# Patient Record
Sex: FEMALE | Race: WHITE | NOT HISPANIC OR LATINO | Employment: FULL TIME | URBAN - METROPOLITAN AREA
[De-identification: names, ages, dates, MRNs, and addresses within clinical notes are randomized per-mention and may not be internally consistent; named-entity substitution may affect disease eponyms.]

---

## 2018-02-14 ENCOUNTER — HOME CARE/HOSPICE - HEALTHEAST (OUTPATIENT)
Dept: HOME HEALTH SERVICES | Facility: HOME HEALTH | Age: 33
End: 2018-02-14

## 2018-02-17 ENCOUNTER — HOME CARE/HOSPICE - HEALTHEAST (OUTPATIENT)
Dept: HOME HEALTH SERVICES | Facility: HOME HEALTH | Age: 33
End: 2018-02-17

## 2018-02-20 ENCOUNTER — COMMUNICATION - HEALTHEAST (OUTPATIENT)
Dept: OBGYN | Facility: HOSPITAL | Age: 33
End: 2018-02-20

## 2018-02-21 ENCOUNTER — COMMUNICATION - HEALTHEAST (OUTPATIENT)
Dept: OBGYN | Facility: HOSPITAL | Age: 33
End: 2018-02-21

## 2018-02-22 ENCOUNTER — COMMUNICATION - HEALTHEAST (OUTPATIENT)
Dept: OBGYN | Facility: HOSPITAL | Age: 33
End: 2018-02-22

## 2018-02-23 ENCOUNTER — AMBULATORY - HEALTHEAST (OUTPATIENT)
Dept: OBGYN | Facility: HOSPITAL | Age: 33
End: 2018-02-23

## 2018-03-01 ENCOUNTER — COMMUNICATION - HEALTHEAST (OUTPATIENT)
Dept: OBGYN | Facility: HOSPITAL | Age: 33
End: 2018-03-01

## 2018-03-06 ENCOUNTER — AMBULATORY - HEALTHEAST (OUTPATIENT)
Dept: OBGYN | Facility: HOSPITAL | Age: 33
End: 2018-03-06

## 2021-05-30 ENCOUNTER — RECORDS - HEALTHEAST (OUTPATIENT)
Dept: ADMINISTRATIVE | Facility: CLINIC | Age: 36
End: 2021-05-30

## 2021-06-16 PROBLEM — Z34.90 PREGNANT: Status: ACTIVE | Noted: 2018-02-12

## 2021-06-16 NOTE — PROGRESS NOTES
Infant:  Alex Mason  2-    CC 1:  Weight - Weight today 7# 9.3 oz = + 4 oz weight gain 2 days - Great improvement:  How: Mother Dasha opted to bottle feed baby over last 2 days, using APNO, nipples starting to heal, baby spit up not improving despite tips; keep baby upright, burp techniques, paced bottle upright and/or side lying, Plan:  Continue with techniques , go to premie flow, fu next week lactation, weight will be reported to MD via [employer] fax    CC 2:  Risk for yeast/thrush: Infant was seen to have white film on tongue 2 days ago, no other s/s, is developing a diaper rash in addition to white film on tongue, couple at high risk for yeast due to nipple trama, mother is being treated with APNO, Education sheet provided at previous appt. Plan:  recommendation to have MD gema s/s of yeast.     CC 3:  Spit up/not contented after feedings,  baby observed today to have moderate-excessive spit up during our 20 min appt, observed a bottle feeding, baby takes 4 sucks, swallows then needs a breathing break, then starts again, unsettled and fussy after feedings, using pacifier to allow her time to settle belly but not huge impact in decreasing spit up.  Baby tends to roll in upper lip during latch on bottle, due to latch issues, mothers cracked nipples suggested to have MD ribeiro mouth for potential restrictions Plan:  discussed for her to have MD ribeiro baby for spit up/latch issues

## 2021-06-16 NOTE — PROGRESS NOTES
Weight check:  8# 8.7 oz - over 1 oz per weight gain per day average on ebf'ing   Diaper rash still present, spit up also still there, but congestion has slightly decreased, will fu with Md about diaper rash, breastfeeding has now improved and will continue with ebfing and pumping for return to work/ call back if any concerns

## 2022-03-31 ENCOUNTER — LAB REQUISITION (OUTPATIENT)
Dept: LAB | Facility: CLINIC | Age: 37
End: 2022-03-31

## 2022-03-31 DIAGNOSIS — R51.9 HEADACHE, UNSPECIFIED: ICD-10-CM

## 2022-03-31 LAB
BASOPHILS # BLD AUTO: 0.1 10E3/UL (ref 0–0.2)
BASOPHILS NFR BLD AUTO: 1 %
C REACTIVE PROTEIN LHE: <0.1 MG/DL (ref 0–0.8)
EOSINOPHIL # BLD AUTO: 0.1 10E3/UL (ref 0–0.7)
EOSINOPHIL NFR BLD AUTO: 2 %
ERYTHROCYTE [DISTWIDTH] IN BLOOD BY AUTOMATED COUNT: 13 % (ref 10–15)
ERYTHROCYTE [SEDIMENTATION RATE] IN BLOOD BY WESTERGREN METHOD: 4 MM/HR (ref 0–20)
HCT VFR BLD AUTO: 40.8 % (ref 35–47)
HGB BLD-MCNC: 13.2 G/DL (ref 11.7–15.7)
IMM GRANULOCYTES # BLD: 0 10E3/UL
IMM GRANULOCYTES NFR BLD: 0 %
LYMPHOCYTES # BLD AUTO: 2.9 10E3/UL (ref 0.8–5.3)
LYMPHOCYTES NFR BLD AUTO: 43 %
MCH RBC QN AUTO: 29.3 PG (ref 26.5–33)
MCHC RBC AUTO-ENTMCNC: 32.4 G/DL (ref 31.5–36.5)
MCV RBC AUTO: 91 FL (ref 78–100)
MONOCYTES # BLD AUTO: 0.7 10E3/UL (ref 0–1.3)
MONOCYTES NFR BLD AUTO: 11 %
NEUTROPHILS # BLD AUTO: 2.9 10E3/UL (ref 1.6–8.3)
NEUTROPHILS NFR BLD AUTO: 43 %
NRBC # BLD AUTO: 0 10E3/UL
NRBC BLD AUTO-RTO: 0 /100
PLATELET # BLD AUTO: 219 10E3/UL (ref 150–450)
RBC # BLD AUTO: 4.5 10E6/UL (ref 3.8–5.2)
WBC # BLD AUTO: 6.7 10E3/UL (ref 4–11)

## 2022-03-31 PROCEDURE — 85025 COMPLETE CBC W/AUTO DIFF WBC: CPT | Performed by: PHYSICIAN ASSISTANT

## 2022-03-31 PROCEDURE — 86140 C-REACTIVE PROTEIN: CPT | Performed by: PHYSICIAN ASSISTANT

## 2022-03-31 PROCEDURE — 85652 RBC SED RATE AUTOMATED: CPT | Performed by: PHYSICIAN ASSISTANT

## 2022-04-13 ENCOUNTER — OFFICE VISIT (OUTPATIENT)
Dept: INTERNAL MEDICINE | Facility: CLINIC | Age: 37
End: 2022-04-13
Payer: COMMERCIAL

## 2022-04-13 ENCOUNTER — ANCILLARY PROCEDURE (OUTPATIENT)
Dept: GENERAL RADIOLOGY | Facility: CLINIC | Age: 37
End: 2022-04-13
Attending: NURSE PRACTITIONER
Payer: COMMERCIAL

## 2022-04-13 VITALS
HEIGHT: 64 IN | SYSTOLIC BLOOD PRESSURE: 120 MMHG | OXYGEN SATURATION: 98 % | WEIGHT: 140.9 LBS | BODY MASS INDEX: 24.05 KG/M2 | TEMPERATURE: 98.3 F | DIASTOLIC BLOOD PRESSURE: 78 MMHG | HEART RATE: 98 BPM

## 2022-04-13 DIAGNOSIS — R07.9 CHEST PAIN, UNSPECIFIED TYPE: Primary | ICD-10-CM

## 2022-04-13 DIAGNOSIS — M54.2 NECK PAIN: ICD-10-CM

## 2022-04-13 DIAGNOSIS — R07.9 CHEST PAIN, UNSPECIFIED TYPE: ICD-10-CM

## 2022-04-13 DIAGNOSIS — G44.219 EPISODIC TENSION-TYPE HEADACHE, NOT INTRACTABLE: ICD-10-CM

## 2022-04-13 LAB
ATRIAL RATE - MUSE: 73 BPM
DIASTOLIC BLOOD PRESSURE - MUSE: NORMAL MMHG
INTERPRETATION ECG - MUSE: NORMAL
P AXIS - MUSE: 10 DEGREES
PR INTERVAL - MUSE: 138 MS
QRS DURATION - MUSE: 84 MS
QT - MUSE: 392 MS
QTC - MUSE: 431 MS
R AXIS - MUSE: 27 DEGREES
SYSTOLIC BLOOD PRESSURE - MUSE: NORMAL MMHG
T AXIS - MUSE: 26 DEGREES
VENTRICULAR RATE- MUSE: 73 BPM

## 2022-04-13 PROCEDURE — 71046 X-RAY EXAM CHEST 2 VIEWS: CPT | Mod: TC | Performed by: RADIOLOGY

## 2022-04-13 PROCEDURE — 93005 ELECTROCARDIOGRAM TRACING: CPT | Performed by: NURSE PRACTITIONER

## 2022-04-13 PROCEDURE — 93010 ELECTROCARDIOGRAM REPORT: CPT | Performed by: INTERNAL MEDICINE

## 2022-04-13 PROCEDURE — 99204 OFFICE O/P NEW MOD 45 MIN: CPT | Performed by: NURSE PRACTITIONER

## 2022-04-13 RX ORDER — LEVONORGESTREL AND ETHINYL ESTRADIOL 0.15-0.03
1 KIT ORAL DAILY
COMMUNITY
Start: 2022-02-12

## 2022-04-13 RX ORDER — CYCLOBENZAPRINE HCL 5 MG
5 TABLET ORAL 3 TIMES DAILY PRN
Qty: 30 TABLET | Refills: 0 | Status: SHIPPED | OUTPATIENT
Start: 2022-04-13 | End: 2022-10-05

## 2022-04-13 RX ORDER — SUMATRIPTAN 50 MG/1
50 TABLET, FILM COATED ORAL
Qty: 30 TABLET | Refills: 0 | Status: SHIPPED | OUTPATIENT
Start: 2022-04-13 | End: 2022-06-13 | Stop reason: SINTOL

## 2022-04-13 ASSESSMENT — ANXIETY QUESTIONNAIRES
6. BECOMING EASILY ANNOYED OR IRRITABLE: NOT AT ALL
5. BEING SO RESTLESS THAT IT IS HARD TO SIT STILL: NOT AT ALL
2. NOT BEING ABLE TO STOP OR CONTROL WORRYING: NOT AT ALL
7. FEELING AFRAID AS IF SOMETHING AWFUL MIGHT HAPPEN: NOT AT ALL
1. FEELING NERVOUS, ANXIOUS, OR ON EDGE: NOT AT ALL
GAD7 TOTAL SCORE: 0
3. WORRYING TOO MUCH ABOUT DIFFERENT THINGS: NOT AT ALL

## 2022-04-13 ASSESSMENT — PATIENT HEALTH QUESTIONNAIRE - PHQ9: 5. POOR APPETITE OR OVEREATING: NOT AT ALL

## 2022-04-13 NOTE — PATIENT INSTRUCTIONS
I want you to continue the naproxen for now.    Lets add cyclobenzaprine muscle relaxer to your regimen to help with neck stiffness.  This might help your headaches as well.    Omeprazole 20 mg 30 minutes before breakfast every day, in case your chest discomfort is due to GI acid reflux    Imitrex migraine medication sent into pharmacy.  Take 1 tablet right away, followed by an additional tablet 2 hours later if symptoms persist.    Work on hydration.  Minimize caffeine and alcohol.  Eat small snacks throughout the day.    Chest x-ray today.    EKG normal.    Establish care appointment with new PCP later this spring.    Referral to physical therapy to work on chest pain and neck pain..    Follow-up in 1 month

## 2022-04-13 NOTE — PROGRESS NOTES
Assessment & Plan     Chest pain, unspecified type  The EKG was personally interpreted by me today as normal sinus rhythm with no ST changes.  Her symptoms are occasionally positional in nature but also seem to be related to food intake.  Chest x-ray today.  Empiric treatment with omeprazole.  I do think she would be a good candidate for PT given her neck pain.  Perhaps PT could also assess her chest discomfort    - EKG 12-lead, tracing only  - omeprazole (PRILOSEC) 20 MG DR capsule; Take 1 capsule (20 mg) by mouth daily  - XR Chest 2 Views; Future    Neck pain  Cyclobenzaprine and PT.  I think her neck pain could be the juanito for her headaches.  Also going to have her continue the naproxen and add Imitrex    - Physical Therapy Referral; Future  - cyclobenzaprine (FLEXERIL) 5 MG tablet; Take 1 tablet (5 mg) by mouth 3 times daily as needed for muscle spasms    Episodic tension-type headache, not intractable  Normal neurological exam today.  She had a brain MRI done at an outside family clinic last week.  There were some chronic stable changes with possible gliosis but unclear if this would be the juanito for her headaches.  If her headaches continue despite today's interventions, we may need to consider referral to neurology    - SUMAtriptan (IMITREX) 50 MG tablet; Take 1 tablet (50 mg) by mouth at onset of headache for migraine May repeat in 2 hours. Max 4 tablets/24 hours.    Patient Instructions   I want you to continue the naproxen for now.    Lets add cyclobenzaprine muscle relaxer to your regimen to help with neck stiffness.  This might help your headaches as well.    Omeprazole 20 mg 30 minutes before breakfast every day, in case your chest discomfort is due to GI acid reflux    Imitrex migraine medication sent into pharmacy.  Take 1 tablet right away, followed by an additional tablet 2 hours later if symptoms persist.    Work on hydration.  Minimize caffeine and alcohol.  Eat small snacks throughout the  day.    Chest x-ray today.    EKG normal.    Establish care appointment with new PCP later this spring.    Referral to physical therapy to work on chest pain and neck pain..    Follow-up in 1 month      Review of external notes as documented elsewhere in note  Prescription drug management  22 minutes spent on the date of the encounter doing chart review, history and exam, documentation and further activities per the note     See Patient Instructions    Return in about 1 month (around 5/13/2022) for Follow up.    Darruis Vanegas, [facility]    Belle Lou is a 37 year old who presents for the following health issues   HPI     Here for constellation of different symptoms.  She has been having some intermittent headaches and actually saw a provider at a community clinic last week.  Brain MRI was ordered.  She had that done a few days ago.  Results are difficult to abstract from her records but it appears as though there is some chronic changes that were noted.  Similar findings when MRI of her brain in 2017.    She describes some intermittent neck pain and stiffness that have started around the same time as her headaches.    Headaches start in the back of her skull and move towards the front of her head.    No strokelike symptoms however.    She does drink caffeine.  Does not smoke.    PHQ-9 and VINI-7 are unremarkable today.    She has had some intermittent chest pain that she describes as an aching pressure focused mostly around the left breast.  Sometimes positional in nature.  It also seems to be affected by her eating patterns.  When she eats something hot or cold it may affect the discomfort in her chest.    No dyspnea on exertion.  No new cough.  No swelling in her lower extremities.  No pain with deep breathing.  No hemoptysis      Review of Systems   Constitutional, HEENT, cardiovascular, pulmonary, gi and gu systems are negative, except as otherwise noted.     "  Objective    /78 (BP Location: Right arm, Patient Position: Sitting, Cuff Size: Adult Regular)   Pulse 98   Temp 98.3  F (36.8  C) (Oral)   Ht 1.626 m (5' 4\")   Wt 63.9 kg (140 lb 14.4 oz)   SpO2 98%   BMI 24.19 kg/m    Body mass index is 24.19 kg/m .  Physical Exam     Normal neurological exam.  Heart rate is regular without murmur rub or gallop.  Neck strength testing is unremarkable.  Normal ROM    Answers for HPI/ROS submitted by the patient on 4/13/2022  Headache Symptoms are: worsened  How often are you getting headaches or migraines? : Daily  Are you able to do normal daily activities when you have a migraine?: Yes  Migraine Rescue/Relief Medications:: Naproxyn (Aleve)  Effectiveness of rescue/relief medications:: I get no relief  Migraine Preventative Medications:: no medications to prevent migraines  ER or UC Visits:: 0 times  Your back pain is: recurring  Where is your back pain located? : left upper back  How would you describe your back pain? : dull ache  Where does your back pain spread? : left shoulder, left side of neck  Since you noticed your back pain, how has it changed? : always present, but gets better and worse  Does your back pain interfere with your job?: No  How many servings of fruits and vegetables do you eat daily?: 2-3  On average, how many sweetened beverages do you drink each day (Examples: soda, juice, sweet tea, etc.  Do NOT count diet or artificially sweetened beverages)?: 1  How many minutes a day do you exercise enough to make your heart beat faster?: 10 to 19  How many days a week do you exercise enough to make your heart beat faster?: 4  How many days per week do you miss taking your medication?: 0      "

## 2022-04-14 ASSESSMENT — ANXIETY QUESTIONNAIRES: GAD7 TOTAL SCORE: 0

## 2022-04-18 ENCOUNTER — HOSPITAL ENCOUNTER (OUTPATIENT)
Dept: PHYSICAL THERAPY | Facility: REHABILITATION | Age: 37
Discharge: HOME | End: 2022-04-18
Payer: COMMERCIAL

## 2022-04-18 DIAGNOSIS — M54.2 NECK PAIN: ICD-10-CM

## 2022-04-18 PROCEDURE — 97161 PT EVAL LOW COMPLEX 20 MIN: CPT | Mod: GP

## 2022-04-18 PROCEDURE — 97110 THERAPEUTIC EXERCISES: CPT | Mod: GP

## 2022-04-18 NOTE — PROGRESS NOTES
[facility]    OUTPATIENT PHYSICAL THERAPY ORTHOPEDIC EVALUATION  PLAN OF TREATMENT FOR OUTPATIENT REHABILITATION  (COMPLETE FOR INITIAL CLAIMS ONLY)  Patient's Last Name, First Name, M.I.  YOB: 1985  Dasha Leonard    Provider s Name:  [facility]   Medical Record No.  [id number]   Start of Care Date:  04/18/22   Onset Date:  03/28/22   Type:     _X__PT   ___OT   ___SLP Medical Diagnosis:  (P) Cervical pain     PT Diagnosis:  Cervical neck pain   Visits from SOC:  1      _________________________________________________________________________________  Plan of Treatment/Functional Goals:  manual therapy, neuromuscular re-education, ROM, strengthening, stretching     TENS, Traction     Goals  Goal Identifier: driving  Goal Description: pt will be able to drive with hand on steering wheel without symptoms in order to arrive at work safely  Target Date: 07/16/22    Goal Identifier: headaches  Goal Description: pt will report decreased headache frequency to 1-2X/week  Target Date: 07/16/22                                                                      Therapy Frequency:  2 times/Week  Predicted Duration of Therapy Intervention:  6-8, prn    Jenny Mcbride PT                 I CERTIFY THE NEED FOR THESE SERVICES FURNISHED UNDER        THIS PLAN OF TREATMENT AND WHILE UNDER MY CARE     (Physician co-signature of this document indicates review and certification of the therapy plan).                     Certification Date From:  (P) 04/18/22   Certification Date To:  (P) 06/17/22    Referring Provider:  Darrius Vanegas CNP    Initial Assessment        See Epic Evaluation Start of Care Date: 04/18/22 04/18/22 0700   General Information   Type of Visit Initial OP Ortho PT Evaluation    Start of Care Date 04/18/22   Referring Physician Darrius Vanegas CNP   Orders Evaluate and Treat   Date of Order 04/13/22   Certification Required? Yes   Medical Diagnosis Cervical pain   Surgical/Medical history reviewed Yes   Precautions/Limitations no known precautions/limitations   General Information Comments Direct supervision provided; direct patient contact provided; therapy services provided with the co-signing licensed therapist guiding and directing the services as well as providing skilled judgement and assessment throughout the session.   Body Part(s)   Body Part(s) Cervical Spine   Presentation and Etiology   Pertinent history of current problem (include personal factors and/or comorbidities that impact the POC) Pt had chest pain in January 2022. She reports that her chest pain radiates to her back. About 3 weeks ago she had sharp pain in her head, with L>R arm pain that occurred simultaneously. She also experienced neck pain and brief vision changes during these episodes. Her primary care provider suggested PT and wrote a referral. She received her MRI results which came back as negative. She is currently taking acid reflux meds and they appear to be helping with her chest pain. She noticed chocolate can trigger chest pain. Pt was also prescribed a mm relaxer and she reports that it seems to be helping. Her pain comes and goes, and she finds that scapular retraction helps relieve pain.   Impairments A. Pain;N. Headaches   Functional Limitations perform activities of daily living   Symptom Location Cervical spine, head   How/Where did it occur From insidious onset   Onset date of current episode/exacerbation 03/28/22   Chronicity New   Pain rating (0-10 point scale) Best (/10);Worst (/10)   Best (/10) 1   Worst (/10) 5   Pain quality B. Dull   Frequency of pain/symptoms A. Constant   Pain/symptoms are: Other   Pain symptoms comment no pattern   Pain/symptoms exacerbated by M. Other   Pain  "exacerbation comment chocolate exacerbates chest pain, L shoulder flexion (driving)   Pain/symptoms eased by K. Other   Pain eased by comment stretching   Progression of symptoms since onset: Other   Pain progression comment headaches and chest pain have gotten better   Prior Level of Function   Prior Level of Function-Mobility decreases when symptoms increase   Fall Risk Screen   Fall screen completed by PT   Have you fallen 2 or more times in the past year? No   Have you fallen and had an injury in the past year? No   Is patient a fall risk? No   Abuse Screen (yes response referral indicated)   Feels Unsafe at Home or Work/School no   Feels Threatened by Someone no   Does Anyone Try to Keep You From Having Contact with Others or Doing Things Outside Your Home? no   Physical Signs of Abuse Present no   Patient needs abuse support services and resources No   Cervical Spine   Cervical Left Side Bending ROM WNL   Cervical Right Rotation ROM WNL   Cervical Left Rotation ROM WNL   Cervical Flexion ROM WNL, pain in left ribs/midchest   Cervical Extension ROM WNL   Cervical Right Side Bending ROM WNL   Shoulder AROM Screen WNL   Cervical/Thoracic/Shoulder ROM Comments shoulder flexion and abduction on L increases pain in midchest/ribs   Shoulder/Wrist/Hand Strength Comments strength Normal throughout, pain with L shoulder ER at ribs/midchest   Vertebral Artery Test negative   Alar Ligament Test negative   Cervical Distraction Test negative as it does not reporoduce pain or relieve any symptoms   Palpation denies pain to palpation, states pain is deeper.  At SO she had \"vision spots\",   Posture Moderate lordosis, B toe out   Planned Therapy Interventions   Planned Therapy Interventions manual therapy;neuromuscular re-education;ROM;strengthening;stretching   Planned Modality Interventions   Planned Modality Interventions TENS;Traction   Clinical Impression   Criteria for Skilled Therapeutic Interventions Met yes, treatment " indicated   PT Diagnosis Cervical neck pain   Influenced by the following impairments comorbidities   Functional limitations due to impairments driving, holding objects, teaching   Clinical Presentation Stable/Uncomplicated   Clinical Decision Making (Complexity) Low complexity   Therapy Frequency 2 times/Week   Predicted Duration of Therapy Intervention (days/wks) 6-8, prn   Risk & Benefits of therapy have been explained Yes   Patient, Family & other staff in agreement with plan of care Yes   Clinical Impression Comments Pt presents to PT with orders for eval and treat of cervical headaches. Pt presents with symptoms of pain, generalized deconditioning, and moderate lordosis. Skilled PT needed for pain reduction, strength training and education.   ORTHO GOALS   PT Ortho Eval Goals 1;2   Ortho Goal 1   Goal Identifier driving   Goal Description pt will be able to drive with hand on steering wheel without symptoms in order to arrive at work safely   Target Date 07/16/22   Ortho Goal 2   Goal Identifier headaches   Goal Description pt will report decreased headache frequency to 1-2X/week   Target Date 07/16/22   Total Evaluation Time   PT Eval, Low Complexity Minutes (44653) 30   Therapy Certification   Certification date from 04/18/22   Certification date to 06/17/22   Medical Diagnosis Cervical pain

## 2022-04-18 NOTE — ADDENDUM NOTE
Encounter addended by: Jenny Mcbride, PT on: 4/18/2022 1:38 PM   Actions taken: Clinical Note Signed, Flowsheet accepted, Document created, Document edited

## 2022-04-18 NOTE — PROGRESS NOTES
Pt had chest pain in January 2022. She reports that her chest pain radiates to her back. About 3 weeks ago she had sharp pain in her head, with L>R arm pain that occurred simultaneously. She experienced neck pain, vision changes, primary suggested PT, MRI results negative, acid reflux meds are helping with chest pain,-chocolate can set the pain off,  mm relaxer seems to be helping. Pain comes and goes, shoulder retraction helps relieve pain      Plan next visit-fill out NDI

## 2022-05-12 ENCOUNTER — OFFICE VISIT (OUTPATIENT)
Dept: INTERNAL MEDICINE | Facility: CLINIC | Age: 37
End: 2022-05-12
Payer: COMMERCIAL

## 2022-05-12 VITALS
OXYGEN SATURATION: 100 % | BODY MASS INDEX: 25.03 KG/M2 | DIASTOLIC BLOOD PRESSURE: 58 MMHG | HEART RATE: 79 BPM | WEIGHT: 145.8 LBS | SYSTOLIC BLOOD PRESSURE: 104 MMHG

## 2022-05-12 DIAGNOSIS — G44.209 TENSION HEADACHE: Primary | ICD-10-CM

## 2022-05-12 DIAGNOSIS — R10.13 ABDOMINAL DISCOMFORT, EPIGASTRIC: ICD-10-CM

## 2022-05-12 DIAGNOSIS — M54.2 NECK PAIN: ICD-10-CM

## 2022-05-12 PROCEDURE — 99213 OFFICE O/P EST LOW 20 MIN: CPT | Performed by: NURSE PRACTITIONER

## 2022-05-12 NOTE — PROGRESS NOTES
Assessment & Plan     Tension headache  Unclear if these are migraine headaches.  She tried Imitrex but this made her ill.  I think it would be beneficial to get a neurology consult.  She has had a normal head MRI  - Adult Neurology  Referral; Future    Abdominal discomfort, epigastric  She saw great efficacy from the omeprazole.  I told her that she could feasibly restart the omeprazole at a later date if needed.  She should not need it for more than 2 weeks at a time    Neck pain  Advised that she continue with the physical therapy.  This could feasibly improve her headache symptoms    Patient Instructions   Neurology: [phone]      Darrius Vanegas, CNP  [facility]    Belle Lou is a 37 year old who presents for the following health issues   HPI     Here for follow-up.  I originally saw her for a constellation of different symptoms approximately 1 month ago.    Her biggest issue at that time was intermittent headaches that she had been experiencing.  We tried Imitrex but that made her feel sick.  She had an MRI done at a community clinic that came back normal.    She had 1 session of PT for her neck pain and says that this seems to be helping.    Has been on omeprazole for a few weeks for the chest discomfort and says that this seemed to help      Review of Systems   Constitutional, HEENT, cardiovascular, pulmonary, gi and gu systems are negative, except as otherwise noted.      Objective    /58 (BP Location: Right arm, Patient Position: Sitting, Cuff Size: Adult Regular)   Pulse 79   Wt 66.1 kg (145 lb 12.8 oz)   SpO2 100%   BMI 25.03 kg/m    Body mass index is 25.03 kg/m .  Physical Exam   Healthy-appearing adult female

## 2022-05-18 ENCOUNTER — HOSPITAL ENCOUNTER (OUTPATIENT)
Dept: PHYSICAL THERAPY | Facility: REHABILITATION | Age: 37
Discharge: HOME | End: 2022-05-18
Payer: COMMERCIAL

## 2022-05-18 DIAGNOSIS — M54.2 NECK PAIN: Primary | ICD-10-CM

## 2022-05-18 PROCEDURE — 97110 THERAPEUTIC EXERCISES: CPT | Mod: GP

## 2022-05-18 PROCEDURE — 97140 MANUAL THERAPY 1/> REGIONS: CPT | Mod: GP

## 2022-05-18 NOTE — PROGRESS NOTES
Assessment: Pt returns for her 2nd visit and reports a decrease in her pain symptoms, and the arm weakness is still present. Pivitol Therapy system introduced to    pt and she reported feeling significant relief after trialing. HEP initiated and instructed pt in new ex. She completed these with proper form and cueing for equal hips while completing leg ext. MT performed on pts cervical region for relief of symptoms. Pt reported feeling relief after treatment.    Date 5/18/2022   Exercise    Stretches: chin tuck, upper trap, levator scapulae, scalenes X 30 s   Cervical isometrics: SB, rotate, ext X 6-8 reps   All 4s leg ext X 8 reps B   Cat cow X 10 reps with 2 second holds                                       I attest that I was present in the room, making skilled assessments and directing the service provided today.  I was responsible for the assessment and treatment of the patient.  During this time, I was not engaged in treating another patient or doing other tasks.    Jenny Mcbride, PT  Magdalena Alegria, SPT

## 2022-05-23 ENCOUNTER — HOSPITAL ENCOUNTER (OUTPATIENT)
Dept: PHYSICAL THERAPY | Facility: REHABILITATION | Age: 37
Discharge: HOME | End: 2022-05-23
Payer: COMMERCIAL

## 2022-05-23 DIAGNOSIS — M54.2 NECK PAIN: Primary | ICD-10-CM

## 2022-05-23 PROCEDURE — 97140 MANUAL THERAPY 1/> REGIONS: CPT | Mod: GP | Performed by: PHYSICAL THERAPIST

## 2022-05-23 PROCEDURE — 97110 THERAPEUTIC EXERCISES: CPT | Mod: GP | Performed by: PHYSICAL THERAPIST

## 2022-05-23 NOTE — PROGRESS NOTES
Assessment: Pt returns and feels she is about the same.  She did not have any questions on her HEP and did not want to review them. She does feel the stretching helps. She did not have any pain with the addition of the strengthening ex today and did feel the manual therapy helped at her SO region.   She did not have as much pressure and tightness leaving.     Date 5/23/22 5/18/2022   Exercise     Stretches: chin tuck, upper trap, levator scapulae, scalenes  X 30 s   Cervical isometrics: SB, rotation, ext  X 6-8 reps   All 4s leg ext  X 8 reps B   Cat cow  X 10 reps with 2 second holds   UBE 4'    Scapular rows Green tband  X 20 Orange tband X 20   Shoulder ext Green tband: 20    PNF D2 shoulder diagonal Green tband X 20                            Jenny Mcbride, PT  5/23/22

## 2022-06-10 ENCOUNTER — HOSPITAL ENCOUNTER (OUTPATIENT)
Dept: PHYSICAL THERAPY | Facility: REHABILITATION | Age: 37
Discharge: HOME | End: 2022-06-10
Payer: COMMERCIAL

## 2022-06-10 DIAGNOSIS — M54.2 NECK PAIN: Primary | ICD-10-CM

## 2022-06-10 PROCEDURE — 97140 MANUAL THERAPY 1/> REGIONS: CPT | Mod: GP | Performed by: PHYSICAL THERAPIST

## 2022-06-10 PROCEDURE — 97110 THERAPEUTIC EXERCISES: CPT | Mod: GP | Performed by: PHYSICAL THERAPIST

## 2022-06-10 NOTE — PROGRESS NOTES
"  Assessment: Pt returns and feels the left UE is still weaker.  Overall symptoms are decreasing though.  School has been out for a week so doesn't have to hold a book up to read anymore, which has helped decrease some symptoms.  Due to the weakness in her UE she wanted a few more strengthening ex.  She did not feel any pain with them while doing them in the clinic.  Only one area remains sore during manual therapy on L C3-4 region, which decreased in tightness with manual therapy.       Date 6/10/22 5/23/22 5/18/2022   Exercise      Stretches: chin tuck, upper trap, levator scapulae, scalenes   X 30 s   Cervical isometrics: SB, rotation, ext   X 6-8 reps   All 4s leg ext   X 8 reps B   Cat cow   X 10 reps with 2 second holds   UBE  4'    Scapular rows  Green tband  X 20 Orange tband X 20   Shoulder ext  Green tband: 20    PNF D2 shoulder diagonal  Green tband X 20    Shoulder ER and IR, towel under elbow Green tband X 20     Prone:  Shoulder \"I, \"y\", \"T\" and \"W\" X 10-20                     Jenny Mcbride, PT  6/10/22    "

## 2022-06-13 ENCOUNTER — HOSPITAL ENCOUNTER (OUTPATIENT)
Dept: PHYSICAL THERAPY | Facility: REHABILITATION | Age: 37
Discharge: HOME | End: 2022-06-13
Payer: COMMERCIAL

## 2022-06-13 ENCOUNTER — OFFICE VISIT (OUTPATIENT)
Dept: INTERNAL MEDICINE | Facility: CLINIC | Age: 37
End: 2022-06-13

## 2022-06-13 VITALS
BODY MASS INDEX: 24.41 KG/M2 | OXYGEN SATURATION: 100 % | HEART RATE: 78 BPM | SYSTOLIC BLOOD PRESSURE: 104 MMHG | DIASTOLIC BLOOD PRESSURE: 60 MMHG | HEIGHT: 64 IN | WEIGHT: 143 LBS

## 2022-06-13 DIAGNOSIS — Z76.89 ENCOUNTER TO ESTABLISH CARE: ICD-10-CM

## 2022-06-13 DIAGNOSIS — R29.898 BILATERAL ARM WEAKNESS: ICD-10-CM

## 2022-06-13 DIAGNOSIS — M54.2 NECK PAIN: Primary | ICD-10-CM

## 2022-06-13 DIAGNOSIS — M54.2 CERVICALGIA: ICD-10-CM

## 2022-06-13 PROBLEM — R87.810 CERVICAL HIGH RISK HUMAN PAPILLOMAVIRUS (HPV) DNA TEST POSITIVE: Status: ACTIVE | Noted: 2020-09-23

## 2022-06-13 PROBLEM — R63.1 EXCESSIVE THIRST: Status: ACTIVE | Noted: 2022-06-13

## 2022-06-13 PROCEDURE — 97110 THERAPEUTIC EXERCISES: CPT | Mod: GP | Performed by: PHYSICAL THERAPIST

## 2022-06-13 PROCEDURE — 97140 MANUAL THERAPY 1/> REGIONS: CPT | Mod: GP | Performed by: PHYSICAL THERAPIST

## 2022-06-13 PROCEDURE — 99213 OFFICE O/P EST LOW 20 MIN: CPT | Performed by: NURSE PRACTITIONER

## 2022-06-13 NOTE — PATIENT INSTRUCTIONS
To schedule your neck MR call [phone].    Have referred you to see the spine care team, call [phone] to get your first appointment set up.    Finish out physical therapy today.  Continue your home exercises.    Try icy hot or Biofreeze topically as needed for pain control.  Heat 15 minutes 4 times per day, gentle stretching.    We will see you back in 3 months for physical with fasting labs, before then if anything comes up.

## 2022-06-13 NOTE — PROGRESS NOTES
"  Assessment: Pt returns and feels the left UE is still weaker. She saw a new PCP to establish care so got a referral to the [facility].  She did not have time to do the ex since her last visit so reviewed some of them today.  She demonstrated good knowledge with all.  She continued to feel looser after manual therapy again today but after other visits, the tightness returns. Will keep her chart open for 30 days.  Pt will schedule if symptoms change or spine MD wants something specific from PT.  .       Date 6/13/22 6/10/22 5/23/22 5/18/2022   Exercise       Stretches: chin tuck, upper trap, levator scapulae, scalenes    X 30 s   Cervical isometrics: SB, rotation, ext    X 6-8 reps   All 4s leg ext    X 8 reps B   Cat cow    X 10 reps with 2 second holds   UBE 4'  4'    Scapular rows   Green tband  X 20 Orange tband X 20   Shoulder ext   Green tband: 20    PNF D2 shoulder diagonal   Green tband X 20    Shoulder ER and IR, towel under elbow Green tband X 20 Green tband X 20     Prone:  Shoulder \"I, \"y\", \"T\" and \"W\" X 10 each X 10-20                       Jenny Mcbride, PT  6/13/22    "

## 2022-06-13 NOTE — PROGRESS NOTES
Assessment & Plan     Encounter to establish care: Care established today.     Cervicalgia/Bilateral arm weakness: She has tried and failed NSAIDS, physical therapy. Has left greater than right arm weakness, and pain. Suspected nerve or disc issues in her neck. Will obtain an MRI and refer to spine care.   - MR Cervical Spine w/o Contrast  - Spine Referral  - Finish physical therapy.  - Heat 15 minutes four times per day.  - Consider massage.  - Icy hot or biofreeze topically as needed.  - Follow up if symptoms persist or worsen.    Return in about 3 months (around 9/13/2022) for Follow up.    Melany Pereyra CNP  [facility] ADRIENNE Lou is a 37 year old who presents for the following health issues      HPI     The patient presents today to establish care.    She is not fasted today.    She has seen Himanshu Vanegas CNP twice for some neck pain, tension headaches, and bilateral arm weakness, and pain.    She has gone through physical therapy, and her arm weakness, left greater than right continues.    She reports having some pretty significant headaches back a couple of months ago, had a normal brain MRI.    She has not seen the spine care team, will send her there next.    She reports a previous bronchoscopy, had a piece of candy corn removed from a lung.    Her Mom is alive and well. Father passed of lung cancer in 2017, he was 74, was a smoker.    Her grandfather had prostate cancer, and grandmother had breast cancer, with brain metastasis.    She report occasional alcohol. No hx of tobacco or drug use.    She is , has a 4 year old, 2-9 year olds. They are all doing well.    She is a , of autistic and spectrum disorder kids.  Review of Systems   Constitutional, HEENT, cardiovascular, pulmonary, GI, , musculoskeletal, neuro, skin, endocrine and psych systems are negative, except as otherwise noted.      Objective    /60 (BP Location:  "Right arm, Patient Position: Sitting)   Pulse 78   Ht 1.626 m (5' 4\")   Wt 64.9 kg (143 lb)   SpO2 100%   BMI 24.55 kg/m    Body mass index is 24.55 kg/m .  Physical Exam   GENERAL: healthy, alert and no distress  EYES: Eyes grossly normal to inspection, PERRL and conjunctivae and sclerae normal  HENT: ear canals and TM's normal, nose and mouth without ulcers or lesions  NECK: no adenopathy, no asymmetry, masses, or scars and thyroid normal to palpation  RESP: lungs clear to auscultation - no rales, rhonchi or wheezes  CV: regular rate and rhythm, normal S1 S2, no S3 or S4, no murmur, click or rub, no peripheral edema and peripheral pulses strong  MS: no gross musculoskeletal defects noted, no edema  SKIN: no suspicious lesions or rashes  NEURO: Normal strength and tone, mentation intact and speech normal  PSYCH: mentation appears normal, affect normal/bright    "

## 2022-07-11 ENCOUNTER — HOSPITAL ENCOUNTER (OUTPATIENT)
Dept: MRI IMAGING | Facility: CLINIC | Age: 37
Discharge: HOME | End: 2022-07-11
Attending: NURSE PRACTITIONER | Admitting: NURSE PRACTITIONER
Payer: COMMERCIAL

## 2022-07-11 DIAGNOSIS — M54.2 CERVICALGIA: ICD-10-CM

## 2022-07-11 DIAGNOSIS — R29.898 BILATERAL ARM WEAKNESS: ICD-10-CM

## 2022-07-11 PROCEDURE — 72141 MRI NECK SPINE W/O DYE: CPT

## 2022-07-13 ENCOUNTER — TELEPHONE (OUTPATIENT)
Dept: INTERNAL MEDICINE | Facility: CLINIC | Age: 37
End: 2022-07-13

## 2022-07-13 NOTE — TELEPHONE ENCOUNTER
----- Message from Melany Pereyra CNP sent at 7/13/2022  6:49 AM CDT -----  Please call the patient and update her of her results:    IMPRESSION:  1.  Mild degenerative changes in the cervical spine with interspace narrowing upper and mid cervical levels has only minimally progressed from 09/13/2006.     2.  No abnormal intramedullary signal.     3.  There is no evidence for spinal canal compromise or foraminal narrowing at any cervical level      Which means the arthritis in her neck has minimally progressed. No spinal canal narrowing or nerve impingement. See spine care as discussed.

## 2022-07-15 NOTE — PROGRESS NOTES
ASSESSMENT: Dasha Leonard is a 37 year old female with past medical history significant for GERD who presents today for new patient evaluation of neck pain, headaches, left arm weakness, bilateral hand paresthesias.  Symptoms all began about 6 months ago after having COVID.  An MRI cervical spine showed mild multilevel degenerative change but no spinal canal or neuroforaminal stenosis at any level.  Suspect neck pain and headaches are related to myofascial pain/tension headaches.  She did have hypertonicity of the suboccipital muscles on the left.  Unclear etiology of subjective left upper extremity weakness with shoulder abduction.  She did not have any weakness on exam today and her left shoulder exam was benign.  Intermittent hand paresthesias may be related to carpal tunnel syndrome versus ulnar neuropathy.  - Patient also endorses intermittent left flank pain.  Denies constipation.  Denies problems with urination.  Suspect this might be muscular?  She does not have any thoracic or lumbar spine pain.      PLAN:  A shared decision making model was used.  The patient's values and choices were respected.  The following represents what was discussed and decided upon by the physician assistant and the patient.      1.  DIAGNOSTIC TESTS: I reviewed the MRI cervical spine.  - I ordered an EMG bilateral upper extremities for further evaluation of weakness.    2.  PHYSICAL THERAPY: Patient attended 5 sessions of physical therapy ending June 13, 2022.  No further physical therapy was ordered.      3.  MEDICATIONS:    - I offered for the patient to trial gabapentin.  She declined for now.  - Patient takes Flexeril about once per week.  - Naproxen was not helpful.    4.  INTERVENTIONS: No interventions were ordered.    -We potentially consider a trigger point injection into the suboccipital muscle on the left.  - If that cannot help, we could try left C2, C3 medial branch blocks.  - Patient did not have tenderness over  the occipital nerves today.    5.  PATIENT EDUCATION: Patient is in agreement the above plan.  All questions were answered.    6.  FOLLOW-UP:   A nurse will call the patient with results of her EMG.  If she has questions or concerns in the meantime, she should not hesitate to call.        SUBJECTIVE:  Dasha Leonard  Is a 37 year old female who presents today in consultation at the request of Melany Pereyra CNP for new patient evaluation of neck pain/headaches, left arm weakness, bilateral hand paresthesias.  Patient reports that in January 2022 she ate a frosty at [employer].  She had abrupt onset of chest pain.  1 week later she developed COVID.  Shortly after having COVID she experienced a constellation of symptoms including severe headache, neck pain, intermittent hand paresthesias, left flank pain.  Patient reports that she saw her primary care provider at [facility] initially and they prescribed naproxen which was not helpful.  She then sought care with Darrius Amin CNP through [facility].  He prescribed omeprazole which has been very helpful for the chest pain.  He also prescribed Flexeril which has provided some relief of her neck pain and headaches, but symptoms persisted.  She was referred to physical therapy and has had 5 sessions.  She then switched to another doctor who ordered an MRI cervical spine and she is referred to our clinic for further evaluation and treatment.    Patient complains of left-sided neck pain.  Pain is located at the craniocervical junction.  Pain radiates up into the occipital region causing a headache.  She also experiences a left frontal headache.  She states that she feels like there is a knot in the tight muscle in her neck.  This pain comes and goes.  She is unable to identify any aggravating factors to the pain.  Manual traction at physical therapy was helpful.    Patient denies any pain down the arm but she states that the left arm was weak.  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  She would have to adduct her left shoulder and lift the book using only her elbow and wrist muscles to compensate.  At times lifting her arm to drive also because of the weak sensation.  She is right-handed.  Denies any right-sided weakness.    Patient also complains of intermittent hand paresthesias.  Patient reports that after having COVID is soon if she would lay down she would get numbness and tingling in both hands which she states affected the whole hand bilaterally.  This is improving but still occurs occasionally.    Patient also complains of left flank pain.  Denies constipation.  Denies changes with urination.  Denies back pain.    Patient attended 5 sessions of physical therapy ending June 13, 2022.  She still does some of her home exercises.  She does not go to a chiropractor.  She is never had any spine surgeries or spine injections.  She is taking Flexeril about once per week which is somewhat helpful.    Current Outpatient Medications   Medication     cyclobenzaprine (FLEXERIL) 5 MG tablet     KURVELO 0.15-30 MG-MCG tablet         Allergies   Allergen Reactions     Sulfa (Sulfonamide Antibiotics) [Sulfa Drugs] Rash         Patient Active Problem List   Diagnosis     Pregnant     Cervical high risk human papillomavirus (HPV) DNA test positive     Excessive thirst       Surgical history: None    Family history: Father, grandmother, grandfather, and cousin all had cancer.    Social history: Patient is .  She is a special .  She has 3 daughters aged 9, 9, 4.  She drinks alcohol once per month.  Denies tobacco use.  Denies illicit drug use.    ROS: Positive for headache, difficulty swallowing, chest pain, nausea, reflux, muscle fatigue, fainting.  Specifically negative for imbalance, fine motor skill difficulties, bowel/bladder dysfunction, fevers,chills, appetite changes, unexplained  weight loss.   Otherwise 13 systems reviewed are negative.  Please see the patient's intake questionnaire from today for details.      OBJECTIVE:  PHYSICAL EXAMINATION:  CONSTITUTIONAL:  Vital signs as above.  No acute distress.  The patient is well nourished and well groomed.  PSYCHIATRIC:  The patient is awake, alert, oriented to person, place, time and answering questions appropriately with clear speech.    HEENT:  Normocephalic, atraumatic.  Sclera clear.    SKIN:  Skin over the face, bilateral upper extremities, and neck is clean, dry, intact without rashes.  LYMPH NODES:  No palpable or tender anterior/posterior cervical, submandibular, or supraclavicular lymph nodes.    MUSCLE STRENGTH:  5/5 strength for the bilateral shoulder abductors, elbow flexors/extensors, wrist extensors, finger flexors/abductors.  NEURO:  CN III-XII are grossly intact.  1-2+ symmetric biceps, brachioradialis, triceps reflexes bilaterally.  Sensation to light touch intact bilateral upper extremities throughout.  Negative Galarza's bilaterally.  Negative Tinel sign bilateral carpal tunnel and bilateral cubital tunnel.  Negative Phalen sign bilaterally.  VASCULAR:  2/4 radial pulses bilaterally.  Warm upper limbs bilaterally.  Capillary refill in the upper extremities is less than 1 second.  MUSCULOSKELETAL: Patient has mild hypermobility cervical spine.  Hypertonicity left cervical paraspinous muscles in the suboccipital region.  No tenderness to palpation occipital nerves.  No tenderness palpation about the left shoulder.  Range of motion of the left shoulder is full.  Negative empty can sign.  Negative scarf sign.    RESULTS: I reviewed the MRI cervical spine from [facility] dated July 11, 2022.  There is mild multilevel disc degeneration.  There is no spinal canal or neuroforaminal stenosis at any level.  There is no abnormal cord signal.

## 2022-07-18 ENCOUNTER — OFFICE VISIT (OUTPATIENT)
Dept: PHYSICAL MEDICINE AND REHAB | Facility: CLINIC | Age: 37
End: 2022-07-18
Payer: COMMERCIAL

## 2022-07-18 VITALS
HEIGHT: 64 IN | DIASTOLIC BLOOD PRESSURE: 63 MMHG | WEIGHT: 141.1 LBS | SYSTOLIC BLOOD PRESSURE: 117 MMHG | HEART RATE: 90 BPM | BODY MASS INDEX: 24.09 KG/M2

## 2022-07-18 DIAGNOSIS — R29.898 BILATERAL ARM WEAKNESS: ICD-10-CM

## 2022-07-18 DIAGNOSIS — M54.2 CERVICALGIA: ICD-10-CM

## 2022-07-18 DIAGNOSIS — R20.2 PARESTHESIA: Primary | ICD-10-CM

## 2022-07-18 PROCEDURE — 99204 OFFICE O/P NEW MOD 45 MIN: CPT | Performed by: PHYSICIAN ASSISTANT

## 2022-07-18 ASSESSMENT — PAIN SCALES - GENERAL: PAINLEVEL: NO PAIN (1)

## 2022-07-18 NOTE — LETTER
7/18/2022         RE: Dasha Leonard  [address]  [place] [address]        Dear Colleague,    Thank you for referring your patient, Dasha Leonard, to the [facility]. Please see a copy of my visit note below.    ASSESSMENT: Dasha Leonard is a 37 year old female with past medical history significant for GERD who presents today for new patient evaluation of neck pain, headaches, left arm weakness, bilateral hand paresthesias.  Symptoms all began about 6 months ago after having COVID.  An MRI cervical spine showed mild multilevel degenerative change but no spinal canal or neuroforaminal stenosis at any level.  Suspect neck pain and headaches are related to myofascial pain/tension headaches.  She did have hypertonicity of the suboccipital muscles on the left.  Unclear etiology of subjective left upper extremity weakness with shoulder abduction.  She did not have any weakness on exam today and her left shoulder exam was benign.  Intermittent hand paresthesias may be related to carpal tunnel syndrome versus ulnar neuropathy.  - Patient also endorses intermittent left flank pain.  Denies constipation.  Denies problems with urination.  Suspect this might be muscular?  She does not have any thoracic or lumbar spine pain.      PLAN:  A shared decision making model was used.  The patient's values and choices were respected.  The following represents what was discussed and decided upon by the physician assistant and the patient.      1.  DIAGNOSTIC TESTS: I reviewed the MRI cervical spine.  - I ordered an EMG bilateral upper extremities for further evaluation of weakness.    2.  PHYSICAL THERAPY: Patient attended 5 sessions of physical therapy ending June 13, 2022.  No further physical therapy was ordered.      3.  MEDICATIONS:    - I offered for the patient to trial gabapentin.  She declined for now.  - Patient takes Flexeril about once per week.  - Naproxen was not  helpful.    4.  INTERVENTIONS: No interventions were ordered.    -We potentially consider a trigger point injection into the suboccipital muscle on the left.  - If that cannot help, we could try left C2, C3 medial branch blocks.  - Patient did not have tenderness over the occipital nerves today.    5.  PATIENT EDUCATION: Patient is in agreement the above plan.  All questions were answered.    6.  FOLLOW-UP:   A nurse will call the patient with results of her EMG.  If she has questions or concerns in the meantime, she should not hesitate to call.        SUBJECTIVE:  Dasha Leonard  Is a 37 year old female who presents today in consultation at the request of Melany Pereyra CNP for new patient evaluation of neck pain/headaches, left arm weakness, bilateral hand paresthesias.  Patient reports that in January 2022 she ate a frosty at [employer].  She had abrupt onset of chest pain.  1 week later she developed COVID.  Shortly after having COVID she experienced a constellation of symptoms including severe headache, neck pain, intermittent hand paresthesias, left flank pain.  Patient reports that she saw her primary care provider at [facility] initially and they prescribed naproxen which was not helpful.  She then sought care with Darrius Amin CNP through [facility].  He prescribed omeprazole which has been very helpful for the chest pain.  He also prescribed Flexeril which has provided some relief of her neck pain and headaches, but symptoms persisted.  She was referred to physical therapy and has had 5 sessions.  She then switched to another doctor who ordered an MRI cervical spine and she is referred to our clinic for further evaluation and treatment.    Patient complains of left-sided neck pain.  Pain is located at the craniocervical junction.  Pain radiates up into the occipital region causing a headache.  She also experiences a left frontal headache.  She states that she feels like there is a knot in the  tight muscle in her neck.  This pain comes and goes.  She is unable to identify any aggravating factors to the pain.  Manual traction at physical therapy was helpful.    Patient denies any pain down the arm but she states that the left arm was weak.  Patient is a  and she noticed to that after having COVID she could not hold a book up with her left arm.  She would have to adduct her left shoulder and lift the book using only her elbow and wrist muscles to compensate.  At times lifting her arm to drive also because of the weak sensation.  She is right-handed.  Denies any right-sided weakness.    Patient also complains of intermittent hand paresthesias.  Patient reports that after having COVID is soon if she would lay down she would get numbness and tingling in both hands which she states affected the whole hand bilaterally.  This is improving but still occurs occasionally.    Patient also complains of left flank pain.  Denies constipation.  Denies changes with urination.  Denies back pain.    Patient attended 5 sessions of physical therapy ending June 13, 2022.  She still does some of her home exercises.  She does not go to a chiropractor.  She is never had any spine surgeries or spine injections.  She is taking Flexeril about once per week which is somewhat helpful.    Current Outpatient Medications   Medication     cyclobenzaprine (FLEXERIL) 5 MG tablet     KURVELO 0.15-30 MG-MCG tablet         Allergies   Allergen Reactions     Sulfa (Sulfonamide Antibiotics) [Sulfa Drugs] Rash         Patient Active Problem List   Diagnosis     Pregnant     Cervical high risk human papillomavirus (HPV) DNA test positive     Excessive thirst       Surgical history: None    Family history: Father, grandmother, grandfather, and cousin all had cancer.    Social history: Patient is .  She is a special .  She has 3 daughters aged 9, 9, 4.  She drinks alcohol once per month.  Denies tobacco use.  Denies  illicit drug use.    ROS: Positive for headache, difficulty swallowing, chest pain, nausea, reflux, muscle fatigue, fainting.  Specifically negative for imbalance, fine motor skill difficulties, bowel/bladder dysfunction, fevers,chills, appetite changes, unexplained weight loss.   Otherwise 13 systems reviewed are negative.  Please see the patient's intake questionnaire from today for details.      OBJECTIVE:  PHYSICAL EXAMINATION:  CONSTITUTIONAL:  Vital signs as above.  No acute distress.  The patient is well nourished and well groomed.  PSYCHIATRIC:  The patient is awake, alert, oriented to person, place, time and answering questions appropriately with clear speech.    HEENT:  Normocephalic, atraumatic.  Sclera clear.    SKIN:  Skin over the face, bilateral upper extremities, and neck is clean, dry, intact without rashes.  LYMPH NODES:  No palpable or tender anterior/posterior cervical, submandibular, or supraclavicular lymph nodes.    MUSCLE STRENGTH:  5/5 strength for the bilateral shoulder abductors, elbow flexors/extensors, wrist extensors, finger flexors/abductors.  NEURO:  CN III-XII are grossly intact.  1-2+ symmetric biceps, brachioradialis, triceps reflexes bilaterally.  Sensation to light touch intact bilateral upper extremities throughout.  Negative Galarza's bilaterally.  Negative Tinel sign bilateral carpal tunnel and bilateral cubital tunnel.  Negative Phalen sign bilaterally.  VASCULAR:  2/4 radial pulses bilaterally.  Warm upper limbs bilaterally.  Capillary refill in the upper extremities is less than 1 second.  MUSCULOSKELETAL: Patient has mild hypermobility cervical spine.  Hypertonicity left cervical paraspinous muscles in the suboccipital region.  No tenderness to palpation occipital nerves.  No tenderness palpation about the left shoulder.  Range of motion of the left shoulder is full.  Negative empty can sign.  Negative scarf sign.    RESULTS: I [facility].  There is mild multilevel disc degeneration.  There is no spinal canal or neuroforaminal stenosis at any level.  There is no abnormal cord signal.        Again, thank you for allowing me to participate in the care of your patient.        Sincerely,        Calli Mendoza PA-C

## 2022-07-18 NOTE — PATIENT INSTRUCTIONS
We are going to do an EMG of both arms which is a test of nerve function.    We talked about some other options for treatment/pain management:  1.  Gabapentin.  This is a nerve pain medication.  It can be very helpful for headaches, neck pain, numbness/tingling.  2.  Osteopathic manipulation.  This is a hands on manipulation of the spine done by a doctor at the spine center.  It can be helpful for neck pain and headaches.  3.  Injections.  I would likely begin with a trigger point injection which is an injection into the tight muscle at the top of your neck.

## 2022-07-20 NOTE — ADDENDUM NOTE
Encounter addended by: Jenny Mcbride, PT on: 7/20/2022 6:50 AM   Actions taken: Episode resolved, Clinical Note Signed

## 2022-07-20 NOTE — PROGRESS NOTES
[facility]    Outpatient Physical Therapy Discharge Note  Patient: Dasha Leonard  : 1985    Beginning/End Dates of Reporting Period:  22 to 22    Referring Provider: Glynn Vanegas, ALAN    Therapy Diagnosis: neck pain     Client Self Report: I established care with a CNP today and she is referring me to the spine center for further evaluation.  I had a busy weekend so didn't get much of a chance to do any of the new ex.    Objective Measurements:  Objective Measure: NDI  Details: 2% on 22      Goals:  Goal Identifier driving   Goal Description pt will be able to drive with hand on steering wheel without symptoms in order to arrive at work safely   Target Date 22   Date Met  22   Progress (detail required for progress note): the left arm can still feel the symptoms at times but overall much improved.     Goal Identifier headaches   Goal Description pt will report decreased headache frequency to 1-2X/week   Target Date 22   Date Met      Progress (detail required for progress note): continue to decrease.  Maybe getting 0-2/week     Goal Identifier     Goal Description     Target Date     Date Met      Progress (detail required for progress note):       Goal Identifier     Goal Description     Target Date     Date Met      Progress (detail required for progress note):       Goal Identifier     Goal Description     Target Date     Date Met      Progress (detail required for progress note):       Goal Identifier     Goal Description     Target Date     Date Met      Progress (detail required for progress note):       Goal Identifier     Goal Description     Target Date     Date Met      Progress (detail required for progress note):       Goal Identifier     Goal Description     Target Date     Date Met      Progress (detail required for progress note):              Plan:  Discharge from therapy.    Discharge:    Reason for Discharge: No further expectation of progress.    Equipment Issued:   Discharge Plan: Patient to continue home program.

## 2022-07-26 DIAGNOSIS — R10.13 ABDOMINAL DISCOMFORT, EPIGASTRIC: Primary | ICD-10-CM

## 2022-07-27 NOTE — TELEPHONE ENCOUNTER
Outpatient Medication Detail     Disp Refills Start End TANYA   omeprazole (PRILOSEC) 20 MG DR capsule (Discontinued) 90 capsule 0 4/13/2022 6/13/2022 --   Sig - Route: Take 1 capsule (20 mg) by mouth daily - Oral   Patient not taking: Reported on 6/13/2022        Sent to pharmacy as: Omeprazole 20 MG Oral Capsule Delayed Release (priLOSEC)   Class: E-Prescribe   Order: [id number]   E-Prescribing Status: Receipt confirmed by pharmacy (4/13/2022  9:01 AM CDT)

## 2022-07-28 RX ORDER — OMEPRAZOLE 20 MG/1
20 TABLET, DELAYED RELEASE ORAL DAILY
Qty: 90 TABLET | Refills: 3 | Status: SHIPPED | OUTPATIENT
Start: 2022-07-28 | End: 2022-10-05

## 2022-08-03 ENCOUNTER — TELEPHONE (OUTPATIENT)
Dept: PHYSICAL MEDICINE AND REHAB | Facility: CLINIC | Age: 37
End: 2022-08-03

## 2022-08-03 ENCOUNTER — OFFICE VISIT (OUTPATIENT)
Dept: PHYSICAL MEDICINE AND REHAB | Facility: CLINIC | Age: 37
End: 2022-08-03
Attending: PHYSICIAN ASSISTANT
Payer: COMMERCIAL

## 2022-08-03 DIAGNOSIS — R29.898 BILATERAL ARM WEAKNESS: ICD-10-CM

## 2022-08-03 DIAGNOSIS — R20.2 PARESTHESIA: ICD-10-CM

## 2022-08-03 DIAGNOSIS — R20.2 PARESTHESIA: Primary | ICD-10-CM

## 2022-08-03 PROCEDURE — 95886 MUSC TEST DONE W/N TEST COMP: CPT | Mod: RT | Performed by: PHYSICAL MEDICINE & REHABILITATION

## 2022-08-03 PROCEDURE — 95911 NRV CNDJ TEST 9-10 STUDIES: CPT | Performed by: PHYSICAL MEDICINE & REHABILITATION

## 2022-08-03 NOTE — LETTER
8/3/2022         RE: Dasha Leonard  [address]  [place] [address]        Dear Colleague,    Thank you for referring your patient, Dasha Leonard, to the [facility]. Please see a copy of my visit note below.    [facility]  [address] [place] [id number]  Office: [phone] Fax: [phone]    Electromyography and Nerve Conduction Study Report        Indication: Patient presents at the request of Calli Mednoza for a bilateral upper extremity EMG.  She has neck pain with bilateral hand numbness and tingling intermittently particularly with holding her hands up or in front of her body.  She has some left upper arm weakness with holding a book out to the side or reading time at school.  On exam she has normal sensation to light touch through the upper extremities, normal reflexes with negative Dante's to the upper extremities, and normal muscle strength at the major muscle groups of the bilateral upper extremities.  Positive Adson's maneuver bilaterally for diminished pulse.          Pt Exam Discussion (Communication Barriers):  Electromyography and nerve conduction testing, including associated discomfort, was discussed with the patient prior to the procedure.  No learning/ communication barriers; patient verbalized understanding of procedure.  Informed consent was obtained.           Pt Assessment:  Testing was successfully completed; patient tolerated testing well.       Blood Thinners: None Skin Temperature: Warmed  31.5                   EMG/NCS  results:     Nerve Conduction Studies  Motor Sites      Amplitude Segment CV Distal Latency F-Latency F-Estimate Temp Comment   Site (mV)  (m/s) (ms) ms ms  C    Left Median (APB) Motor   Wrist 9.8 Wrist-APB  4.4   23.5    Elbow 9.8 Elbow-Wrist 55 8.1   23.5    Right Median (APB) Motor   Wrist 7.9 Wrist-APB  4.4   23.6    Elbow 7.4 Elbow-Wrist 61 7.9   23.6    Left  Ulnar (ADM) Motor   Wrist 12.5   2.8   23.5    Bel Elbow 12.5 Bel Elbow-Wrist 74 5.5   23.5    Ab Elbow 11.9 Ab Elbow-Wrist 74 7.0   23.5    Right Ulnar (ADM) Motor   Wrist 13.5   2.6   23.6    Bel Elbow 12.8 Bel Elbow-Wrist 65 5.7   23.6    Ab Elbow 12.5 Ab Elbow-Wrist 68 7.1   23.6      Sensory Sites      Amplitude CV Onset Lat Peak Lat Temp Comment   Site ( V) (m/s) (ms) (ms)  C    Left Median Anti Sensory   Wrist-Dig II 62 48 2.7 3.3 26    Left Median-Ulnar Palmar Sensory        Median   Palm-Wrist 75 50 1.60 2.2 23.5         Ulnar   Palm-Wrist 36 80 1.00 1.65 23.5    Right Median-Ulnar Palmar Sensory        Median   Palm-Wrist 66 48 1.68 2.2 23.6         Ulnar   Palm-Wrist 28 70 1.15 1.60 23.5    Left Ulnar Anti Sensory   Wrist-Dig V 38 50 2.2 2.9 24.4        NCS Waveforms:    Motor                Sensory                  Electromyography     Side Muscle Nerve Root Ins Act Fibs Psw Amp Dur Poly Recrt Int Pat Comment   Right Deltoid Axillary C5-6 Nml Nml Nml Nml Nml 0 Nml Nml    Right Triceps Radial C6-7-8 Nml Nml Nml Nml Nml 0 Nml Nml    Right PronatorTeres Median C6-7 Nml Nml Nml Nml Nml 0 Nml Nml    Right 1stDorInt Ulnar C8-T1 Nml Nml Nml Nml Nml 0 Nml Nml    Right Abd Poll Brev Median C8-T1 Nml Nml Nml Nml Nml 0 Nml Nml    Left Deltoid Axillary C5-6 Nml Nml Nml Nml Nml 0 Nml Nml    Left Triceps Radial C6-7-8 Nml Nml Nml Nml Nml 0 Nml Nml    Left PronatorTeres Median C6-7 Nml Nml Nml Nml Nml 0 Nml Nml    Left 1stDorInt Ulnar C8-T1 Nml Nml Nml Nml Nml 0 Nml Nml    Left Abd Poll Brev Median C8-T1 Nml Nml Nml Nml Nml 0 Nml Nml          Comment NCS: Abnormal study:    1. Prolonged bilateral median versus ulnar transcarpal latency comparison.  2.  Normal left median and ulnar SNAPs, bilateral median and ulnar transcarpal studies, and bilateral median and ulnar CMAPs.    Comment EMG: Normal study.  1.  Normal needle EMG bilateral upper extremities.    Interpretation: Abnormal study: There is electrodiagnostic  evidence of:    1.  Bilateral median neuropathy at the wrist consistent with entrapment in the carpal tunnel, very mild in severity.  Right equal to left in severity.    2. There is no electrodiagnostic evidence of cervical radiculopathy, brachial plexopathy, or ulnar neuropathy in the bilateral upper extremities.      Note: Patient may wish to trial over-the-counter carpal tunnel splints to wear at night and will follow-up with Calli Mendoza for further recommendations.    The testing was completed in its entirety by the physician.      It was our pleasure caring for your patient today, if there any questions or concerns please do not hesitate to contact us.        Again, thank you for allowing me to participate in the care of your patient.        Sincerely,        Mendel Cruz, DO

## 2022-08-03 NOTE — PROGRESS NOTES
[facility]  [address] [place] [address] [address]  [place] [address]  Office: [phone] Fax: [phone]    Electromyography and Nerve Conduction Study Report        Indication: Patient presents at the request of Calli Mendoza for a bilateral upper extremity EMG.  She has neck pain with bilateral hand numbness and tingling intermittently particularly with holding her hands up or in front of her body.  She has some left upper arm weakness with holding a book out to the side or reading time at school.  On exam she has normal sensation to light touch through the upper extremities, normal reflexes with negative Dante's to the upper extremities, and normal muscle strength at the major muscle groups of the bilateral upper extremities.  Positive Adson's maneuver bilaterally for diminished pulse.          Pt Exam Discussion (Communication Barriers):  Electromyography and nerve conduction testing, including associated discomfort, was discussed with the patient prior to the procedure.  No learning/ communication barriers; patient verbalized understanding of procedure.  Informed consent was obtained.           Pt Assessment:  Testing was successfully completed; patient tolerated testing well.       Blood Thinners: None Skin Temperature: Warmed  31.5                   EMG/NCS  results:     Nerve Conduction Studies  Motor Sites      Amplitude Segment CV Distal Latency F-Latency F-Estimate Temp Comment   Site (mV)  (m/s) (ms) ms ms  C    Left Median (APB) Motor   Wrist 9.8 Wrist-APB  4.4   23.5    Elbow 9.8 Elbow-Wrist 55 8.1   23.5    Right Median (APB) Motor   Wrist 7.9 Wrist-APB  4.4   23.6    Elbow 7.4 Elbow-Wrist 61 7.9   23.6    Left Ulnar (ADM) Motor   Wrist 12.5   2.8   23.5    Bel Elbow 12.5 Bel Elbow-Wrist 74 5.5   23.5    Ab Elbow 11.9 Ab Elbow-Wrist 74 7.0   23.5    Right Ulnar (ADM) Motor   Wrist 13.5   2.6   23.6    Bel Elbow 12.8 Bel Elbow-Wrist 65 5.7   23.6    Ab Elbow 12.5 Ab  Elbow-Wrist 68 7.1   23.6      Sensory Sites      Amplitude CV Onset Lat Peak Lat Temp Comment   Site ( V) (m/s) (ms) (ms)  C    Left Median Anti Sensory   Wrist-Dig II 62 48 2.7 3.3 26    Left Median-Ulnar Palmar Sensory        Median   Palm-Wrist 75 50 1.60 2.2 23.5         Ulnar   Palm-Wrist 36 80 1.00 1.65 23.5    Right Median-Ulnar Palmar Sensory        Median   Palm-Wrist 66 48 1.68 2.2 23.6         Ulnar   Palm-Wrist 28 70 1.15 1.60 23.5    Left Ulnar Anti Sensory   Wrist-Dig V 38 50 2.2 2.9 24.4        NCS Waveforms:    Motor                Sensory                  Electromyography     Side Muscle Nerve Root Ins Act Fibs Psw Amp Dur Poly Recrt Int Pat Comment   Right Deltoid Axillary C5-6 Nml Nml Nml Nml Nml 0 Nml Nml    Right Triceps Radial C6-7-8 Nml Nml Nml Nml Nml 0 Nml Nml    Right PronatorTeres Median C6-7 Nml Nml Nml Nml Nml 0 Nml Nml    Right 1stDorInt Ulnar C8-T1 Nml Nml Nml Nml Nml 0 Nml Nml    Right Abd Poll Brev Median C8-T1 Nml Nml Nml Nml Nml 0 Nml Nml    Left Deltoid Axillary C5-6 Nml Nml Nml Nml Nml 0 Nml Nml    Left Triceps Radial C6-7-8 Nml Nml Nml Nml Nml 0 Nml Nml    Left PronatorTeres Median C6-7 Nml Nml Nml Nml Nml 0 Nml Nml    Left 1stDorInt Ulnar C8-T1 Nml Nml Nml Nml Nml 0 Nml Nml    Left Abd Poll Brev Median C8-T1 Nml Nml Nml Nml Nml 0 Nml Nml          Comment NCS: Abnormal study:    1. Prolonged bilateral median versus ulnar transcarpal latency comparison.  2.  Normal left median and ulnar SNAPs, bilateral median and ulnar transcarpal studies, and bilateral median and ulnar CMAPs.    Comment EMG: Normal study.  1.  Normal needle EMG bilateral upper extremities.    Interpretation: Abnormal study: There is electrodiagnostic evidence of:    1.  Bilateral median neuropathy at the wrist consistent with entrapment in the carpal tunnel, very mild in severity.  Right equal to left in severity.    2. There is no electrodiagnostic evidence of cervical radiculopathy, brachial plexopathy, or  ulnar neuropathy in the bilateral upper extremities.      Note: Patient may wish to trial over-the-counter carpal tunnel splints to wear at night and will follow-up with Calil Mendoza for further recommendations.    The testing was completed in its entirety by the physician.      It was our pleasure caring for your patient today, if there any questions or concerns please do not hesitate to contact us.

## 2022-08-03 NOTE — PATIENT INSTRUCTIONS
Thank you for choosing the [facility] for your EMG testing.    The ordering provider will receive your final EMG results within the next few days.  Please follow up with your provider for the results and further treatment recommendations.

## 2022-08-03 NOTE — TELEPHONE ENCOUNTER
Also per Calli Mendoza PA-C: Dr. Cruz recommends checking an ultrasound to evaluate for thoracic outlet syndrome. The order is in.      Phone call to patient to review results and provider's recommendations. Results given and explained. Recommendations for the wrist splints discussed. Also informed of treatments with OT and US guided injection. She would like to hold off on these for now until after she has had the US to rule out thoracic outlet syndrome. Contact information provided for [facility] radiology scheduling.     Encouraged pt to call nurse navigation line if questions or concerns arise or if she has not been contacted with US results within 48 hours of study. Stated understanding.

## 2022-08-03 NOTE — TELEPHONE ENCOUNTER
----- Message from Calli Mendoza PA-C sent at 8/3/2022 12:13 PM CDT -----  Regarding: EMG results  Please call this patient and let her know that her EMG showed very mild bilateral carpal tunnel syndrome. Recommend that she try over the counter wrist splints at night. Please also offer a referral to OT for carpal tunnel syndrome.  No evidence of nerve injury from the neck.  For the neck pain we could try a trigger point injection under US guidance left cervical paraspinous/suboccipital muscle with Dr. Dillon.

## 2022-08-08 ENCOUNTER — HOSPITAL ENCOUNTER (OUTPATIENT)
Dept: ULTRASOUND IMAGING | Facility: CLINIC | Age: 37
Discharge: HOME | End: 2022-08-08
Attending: PHYSICIAN ASSISTANT
Payer: COMMERCIAL

## 2022-08-08 DIAGNOSIS — R20.2 PARESTHESIA: ICD-10-CM

## 2022-08-08 DIAGNOSIS — R29.898 BILATERAL ARM WEAKNESS: ICD-10-CM

## 2022-08-08 PROCEDURE — 93922 UPR/L XTREMITY ART 2 LEVELS: CPT | Mod: XS

## 2022-08-08 PROCEDURE — 93922 UPR/L XTREMITY ART 2 LEVELS: CPT

## 2022-08-10 ENCOUNTER — TELEPHONE (OUTPATIENT)
Dept: PHYSICAL MEDICINE AND REHAB | Facility: CLINIC | Age: 37
End: 2022-08-10

## 2022-08-10 DIAGNOSIS — G54.0 THORACIC OUTLET SYNDROME: Primary | ICD-10-CM

## 2022-08-10 NOTE — TELEPHONE ENCOUNTER
Nurse navigation to call the patient and let her know that her arterial US showed that she potentially has thoracic inlet syndrome.  If she would like an evaluation with vascular surgery, we can place a referral for her to further evaluate this possible diagnosis.

## 2022-08-26 ENCOUNTER — TELEPHONE (OUTPATIENT)
Dept: PHYSICAL MEDICINE AND REHAB | Facility: CLINIC | Age: 37
End: 2022-08-26

## 2022-08-26 NOTE — TELEPHONE ENCOUNTER
[facility] Call Center    Phone Message    May a detailed message be left on voicemail: yes     Reason for Call: Other: Patient is returning Elzbieta's call. Please contact patient      Action Taken: Message routed to:  [facility] ortho    Travel Screening: Not Applicable

## 2022-08-29 NOTE — TELEPHONE ENCOUNTER
Phone call to patient to give contact information on vascular referral. Left detailed message on dedicated voicemail.

## 2022-08-29 NOTE — TELEPHONE ENCOUNTER
Per chart review and message from call center, patient returned this call Friday, August 26th at 445 PM. Per that encounter, OK to leave a detailed message on voicemail.

## 2022-08-29 NOTE — TELEPHONE ENCOUNTER
Phone call to patient to discuss the arterial US. Questions answered. She would like the referral for evaluation with vascular surgery. Explained [facility] would be notified. Once order has been placed, patient will be called with contact information.     Per patient, detailed message ok upon call back.

## 2022-09-19 ENCOUNTER — NURSE TRIAGE (OUTPATIENT)
Dept: NURSING | Facility: CLINIC | Age: 37
End: 2022-09-19

## 2022-09-19 NOTE — TELEPHONE ENCOUNTER
Nurse Triage SBAR    Situation: Tongue problem    Background: Patient, Dasha, brittany. Consent: not needed.    Assessment: Pt reports white patches on her tongue x 1 month.  Symptom seemed to improve at some point but then got worse again.  She denies having other symptoms.      Protocol Recommended Disposition: See Physician within 3 days. Patient verbalized understanding and had no further questions.  Call transferred to [facility].     Cady Vaughan RN  [facility] Nurse Advisor      Reason for Disposition    [1] White patches that stick to tongue or inner cheek AND [2] can be wiped off    Additional Information    Negative: SEVERE difficulty breathing (e.g., struggling for each breath, speaks in single words, stridor)    Negative: Sounds like a life-threatening emergency to the triager    Negative: [1] Drooling or spitting out saliva (because can't swallow) AND [2] new-onset    Negative: [1] Bleeding from mouth AND [2] won't stop after 10 minutes of direct pressure    Negative: Electrical burn of mouth    Negative: Chemical burn of mouth    Negative: [1] Difficulty breathing AND [2] not severe    Negative: Patient sounds very sick or weak to the triager    Negative: [1] Gum bleeding AND [2] taking Coumadin (warfarin) or other strong blood thinner, or known bleeding disorder (e.g., thrombocytopenia)    Negative: [1] Dry mouth AND [2] urinating more frequently than usual (i.e., frequency)    Negative: [1] Dry mouth AND [2] drinking more liquids than usual (thirsty) AND [3] > 1 day (24 hours)    Negative: Receiving chemotherapy or radiation therapy    Protocols used: MOUTH SYMPTOMS-A-AH

## 2022-09-22 ENCOUNTER — OFFICE VISIT (OUTPATIENT)
Dept: VASCULAR SURGERY | Facility: CLINIC | Age: 37
End: 2022-09-22
Attending: PHYSICIAN ASSISTANT
Payer: COMMERCIAL

## 2022-09-22 VITALS
TEMPERATURE: 98.4 F | RESPIRATION RATE: 12 BRPM | HEART RATE: 84 BPM | DIASTOLIC BLOOD PRESSURE: 78 MMHG | SYSTOLIC BLOOD PRESSURE: 116 MMHG

## 2022-09-22 DIAGNOSIS — G54.0 THORACIC OUTLET SYNDROME: ICD-10-CM

## 2022-09-22 PROCEDURE — G0463 HOSPITAL OUTPT CLINIC VISIT: HCPCS

## 2022-09-22 PROCEDURE — 99204 OFFICE O/P NEW MOD 45 MIN: CPT | Performed by: SURGERY

## 2022-09-22 NOTE — PROGRESS NOTES
[facility]        Patient is here for a consult to discuss thoracic outlet syndrome. US completed 8/8/22. Followed with spine and referred from Calli Mendoza PA-C. Primarily her left arm, shoulder, and trunk that she is having weakness, numbness, tingling, pain. Pain varies on what activities she is doing. 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     Pt is currently taking no meds that would impact our treatment plan.    There were no vitals taken for this visit.    The provider has been notified that the patient has no concerns.     Questions patient would like addressed today are: N/A.    Refills are needed: N/A    Has homecare services and agency name:  Yolanda GALVEZ RN

## 2022-09-22 NOTE — PROGRESS NOTES
VASCULAR SURGERY CLINIC CONSULTATION    VASCULAR SURGEON: Raimundo Morales MD, RPVI     LOCATION:  [facility]    Dasha Leonard   Medical Record #:  [id number]  YOB: 1985  Age:  37 year old     Date of Service: 9/22/2022    PRIMARY CARE PROVIDER: Melany Pereyra      Reason for visit:  Thoracic outlet syndrome    IMPRESSION: Patient with mild complaints in left upper extremity that seem to be neurologic in etiology.  Normal arterial duplex with thoracic outlet maneuvers and recent EMG only significant for mild carpal tunnel bilaterally.  Complaints regarding point tenderness of the trapezius and latissimus muscles seem to be unrelated to thoracic outlet although hard to discern multiple problems with different etiology.  Completed 6 sessions of physical therapy but did not seem to be thoracic outlet syndrome related.  No suspicion for venous or arterial component to potential thoracic outlet syndrome, possible but low suspicion for neurogenic component based on physical exam.    RECOMMENDATION/RISKS: 3 months thoracic outlet syndrome physical therapy followed by scalene injections.  Follow-up in clinic to evaluate symptoms after these interventions.    HPI:  Dasha Leonard is a 37 year old female who was seen today in consultation for left arm heaviness with concern for thoracic outlet syndrome.  Patient reports sudden onset of left arm heaviness that began in January 2022.  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  Describes the feeling as heaviness and weakness but denies any weakness in hand and is able to drink coffee and complete daily activities without issue.  Also admits to bilateral finger numbness when laying flat with recently normal MRI of her C-spine.  Denies swelling of her arms, denies discoloration of her fingers.  No history of trauma.  Does not feel that symptoms are lifestyle limiting.  No other medical  conditions.    REVIEW OF SYSTEMS:    A 12 point ROS was reviewed and is negative except for what in HPI.     PHH:  No past medical history on file.    No past surgical history on file.     ALLERGIES:     Allergies   Allergen Reactions     Sulfa (Sulfonamide Antibiotics) [Sulfa Drugs] Rash        MEDS:    Current Outpatient Medications   Medication     KURVELO 0.15-30 MG-MCG tablet     cyclobenzaprine (FLEXERIL) 5 MG tablet     omeprazole (PRILOSEC OTC) 20 MG EC tablet     No current facility-administered medications for this visit.        SOCIAL HABITS:    Tobacco Use      Smoking status: Never Smoker      Smokeless tobacco: Never Used       Alcohol Use: Not on file       History   Drug Use No        FAMILY HISTORY:  No family history on file.    PE:  /78   Pulse 84   Temp 98.4  F (36.9  C) (Oral)   Resp 12   Wt Readings from Last 1 Encounters:   07/18/22 64 kg (141 lb 1.6 oz)     There is no height or weight on file to calculate BMI.    EXAM:  GENERAL: This is a well-developed 37 year old female who appears her stated age  EYES: Grossly normal.  MOUTH: Buccal mucosa normal   CARDIAC:  Regular rate and rhythm  CHEST/LUNG:  Normal work of breathing on room air  MUSCULOSKELETAL: Grossly normal and both lower extremities are intact.  Drink and sensation intact bilaterally, 5 out of 5.  No recurrence of symptoms with extension of arms or abduction of arms with left and right head rotation.  Point tenderness on trapezius muscle, no point tenderness on scalene or pectoralis muscles.  HEME/LYMPH: No lymphedema  NEUROLOGIC: Focally intact, Alert and oriented x 3.   PSYCH: appropriate affect  INTEGUMENT: No open lesions or ulcers  Pulse Exam:   2+ radial bilaterally        DIAGNOSTIC STUDIES:     Images:  No results found.    I personally reviewed the images and my interpretation is normal MRI C-spine, arterial duplex normal with thoracic outlet syndrome maneuvers.    LABS:      Hemoglobin   Date Value Ref Range  Status   03/31/2022 13.2 11.7 - 15.7 g/dL Final     Platelet Count   Date Value Ref Range Status   03/31/2022 219 150 - 450 10e3/uL Final       30 minutes spent on the day of encounter doing chart review, history and exam, documentation, and further activities as noted.     Tanisha Cochran, DO  VASCULAR SURGERY

## 2022-10-05 ENCOUNTER — OFFICE VISIT (OUTPATIENT)
Dept: INTERNAL MEDICINE | Facility: CLINIC | Age: 37
End: 2022-10-05
Payer: COMMERCIAL

## 2022-10-05 VITALS
HEIGHT: 65 IN | OXYGEN SATURATION: 99 % | SYSTOLIC BLOOD PRESSURE: 98 MMHG | BODY MASS INDEX: 23.34 KG/M2 | DIASTOLIC BLOOD PRESSURE: 58 MMHG | WEIGHT: 140.1 LBS | HEART RATE: 92 BPM

## 2022-10-05 DIAGNOSIS — B37.0 THRUSH: ICD-10-CM

## 2022-10-05 DIAGNOSIS — Z13.228 SCREENING FOR ENDOCRINE, NUTRITIONAL, METABOLIC AND IMMUNITY DISORDER: ICD-10-CM

## 2022-10-05 DIAGNOSIS — Z13.21 SCREENING FOR ENDOCRINE, NUTRITIONAL, METABOLIC AND IMMUNITY DISORDER: ICD-10-CM

## 2022-10-05 DIAGNOSIS — Z13.29 SCREENING FOR ENDOCRINE, NUTRITIONAL, METABOLIC AND IMMUNITY DISORDER: ICD-10-CM

## 2022-10-05 DIAGNOSIS — G54.0 THORACIC OUTLET SYNDROME: ICD-10-CM

## 2022-10-05 DIAGNOSIS — R79.89 ELEVATED SERUM CREATININE: Primary | ICD-10-CM

## 2022-10-05 DIAGNOSIS — Z11.59 NEED FOR HEPATITIS C SCREENING TEST: ICD-10-CM

## 2022-10-05 DIAGNOSIS — Z00.00 ENCOUNTER FOR ANNUAL PHYSICAL EXAM: ICD-10-CM

## 2022-10-05 DIAGNOSIS — Z13.0 SCREENING FOR ENDOCRINE, NUTRITIONAL, METABOLIC AND IMMUNITY DISORDER: ICD-10-CM

## 2022-10-05 DIAGNOSIS — Z11.4 SCREENING FOR HIV (HUMAN IMMUNODEFICIENCY VIRUS): ICD-10-CM

## 2022-10-05 LAB
ALBUMIN SERPL BCG-MCNC: 4.5 G/DL (ref 3.5–5.2)
ALP SERPL-CCNC: 50 U/L (ref 35–104)
ALT SERPL W P-5'-P-CCNC: 21 U/L (ref 10–35)
ANION GAP SERPL CALCULATED.3IONS-SCNC: 9 MMOL/L (ref 7–15)
AST SERPL W P-5'-P-CCNC: 27 U/L (ref 10–35)
BASOPHILS # BLD AUTO: 0 10E3/UL (ref 0–0.2)
BASOPHILS NFR BLD AUTO: 1 %
BILIRUB SERPL-MCNC: 0.9 MG/DL
BUN SERPL-MCNC: 12.9 MG/DL (ref 6–20)
CALCIUM SERPL-MCNC: 9.8 MG/DL (ref 8.6–10)
CHLORIDE SERPL-SCNC: 101 MMOL/L (ref 98–107)
CHOLEST SERPL-MCNC: 258 MG/DL
CREAT SERPL-MCNC: 1.1 MG/DL (ref 0.51–0.95)
DEPRECATED HCO3 PLAS-SCNC: 29 MMOL/L (ref 22–29)
EOSINOPHIL # BLD AUTO: 0.1 10E3/UL (ref 0–0.7)
EOSINOPHIL NFR BLD AUTO: 2 %
ERYTHROCYTE [DISTWIDTH] IN BLOOD BY AUTOMATED COUNT: 12.7 % (ref 10–15)
GFR SERPL CREATININE-BSD FRML MDRD: 66 ML/MIN/1.73M2
GLUCOSE SERPL-MCNC: 101 MG/DL (ref 70–99)
HCT VFR BLD AUTO: 46.2 % (ref 35–47)
HCV AB SERPL QL IA: NONREACTIVE
HDLC SERPL-MCNC: 71 MG/DL
HGB BLD-MCNC: 15.1 G/DL (ref 11.7–15.7)
HIV 1+2 AB+HIV1 P24 AG SERPL QL IA: NONREACTIVE
IMM GRANULOCYTES # BLD: 0 10E3/UL
IMM GRANULOCYTES NFR BLD: 0 %
LDLC SERPL CALC-MCNC: 169 MG/DL
LYMPHOCYTES # BLD AUTO: 2.2 10E3/UL (ref 0.8–5.3)
LYMPHOCYTES NFR BLD AUTO: 50 %
MCH RBC QN AUTO: 29.7 PG (ref 26.5–33)
MCHC RBC AUTO-ENTMCNC: 32.7 G/DL (ref 31.5–36.5)
MCV RBC AUTO: 91 FL (ref 78–100)
MONOCYTES # BLD AUTO: 0.7 10E3/UL (ref 0–1.3)
MONOCYTES NFR BLD AUTO: 16 %
NEUTROPHILS # BLD AUTO: 1.4 10E3/UL (ref 1.6–8.3)
NEUTROPHILS NFR BLD AUTO: 32 %
NONHDLC SERPL-MCNC: 187 MG/DL
PLATELET # BLD AUTO: 277 10E3/UL (ref 150–450)
POTASSIUM SERPL-SCNC: 4.4 MMOL/L (ref 3.4–5.3)
PROT SERPL-MCNC: 7.4 G/DL (ref 6.4–8.3)
RBC # BLD AUTO: 5.08 10E6/UL (ref 3.8–5.2)
SODIUM SERPL-SCNC: 139 MMOL/L (ref 136–145)
TRIGL SERPL-MCNC: 91 MG/DL
WBC # BLD AUTO: 4.3 10E3/UL (ref 4–11)

## 2022-10-05 PROCEDURE — 80061 LIPID PANEL: CPT | Performed by: NURSE PRACTITIONER

## 2022-10-05 PROCEDURE — 36415 COLL VENOUS BLD VENIPUNCTURE: CPT | Performed by: NURSE PRACTITIONER

## 2022-10-05 PROCEDURE — 85025 COMPLETE CBC W/AUTO DIFF WBC: CPT | Performed by: NURSE PRACTITIONER

## 2022-10-05 PROCEDURE — 80053 COMPREHEN METABOLIC PANEL: CPT | Performed by: NURSE PRACTITIONER

## 2022-10-05 PROCEDURE — 99213 OFFICE O/P EST LOW 20 MIN: CPT | Mod: 25 | Performed by: NURSE PRACTITIONER

## 2022-10-05 PROCEDURE — 99395 PREV VISIT EST AGE 18-39: CPT | Performed by: NURSE PRACTITIONER

## 2022-10-05 PROCEDURE — 87389 HIV-1 AG W/HIV-1&-2 AB AG IA: CPT | Performed by: NURSE PRACTITIONER

## 2022-10-05 PROCEDURE — 86803 HEPATITIS C AB TEST: CPT | Performed by: NURSE PRACTITIONER

## 2022-10-05 RX ORDER — NYSTATIN 100000/ML
500000 SUSPENSION, ORAL (FINAL DOSE FORM) ORAL 4 TIMES DAILY
Qty: 140 ML | Refills: 0 | Status: SHIPPED | OUTPATIENT
Start: 2022-10-05 | End: 2022-10-12

## 2022-10-05 ASSESSMENT — ENCOUNTER SYMPTOMS
SORE THROAT: 0
DIZZINESS: 0
NERVOUS/ANXIOUS: 0
NAUSEA: 0
EYE PAIN: 0
BREAST MASS: 0
JOINT SWELLING: 0
PARESTHESIAS: 0
ARTHRALGIAS: 0
SHORTNESS OF BREATH: 0
HEMATURIA: 0
MYALGIAS: 0
WEAKNESS: 0
PALPITATIONS: 0
HEMATOCHEZIA: 0
DIARRHEA: 0
HEADACHES: 0
CHILLS: 0
COUGH: 0
ABDOMINAL PAIN: 0
HEARTBURN: 0
FEVER: 0
DYSURIA: 0
FREQUENCY: 0
CONSTIPATION: 0

## 2022-10-05 NOTE — PATIENT INSTRUCTIONS
Nystatin swish and swallow 4 times a day for 7 days for the oral thrush. If this does not take care of it update me, and I can refer you to ENT at that time.     Push water intake.    Continue to watch your blood pressures at home. I suspect as your thoracic outlet syndrome is resolved this should get better on it's own.    Your labs are processing, I will release results on my chart once they are back.    Follow up in a year with fasted labs, before then if anything comes up.

## 2022-10-05 NOTE — PROGRESS NOTES
SUBJECTIVE:   CC: Dasha is an 37 year old who presents for preventive health visit.       Patient has been advised of split billing requirements and indicates understanding: Yes  The patient presents today for her annual exam. She is fasted today.    She does have a history of abnormal pap smears, is on a yearly schedule. She sees [facility], and will follow up with them for a recheck.    She reports being in Urgent Care, her tongue has had white mattering on it over the past 1-2 months. She can scrape some of it off with her tooth brush, but it keeps coming back. She is drinking about 60 ounces of water per day.    She also reports seeing vascular for her upper back pain, she has thoracic outlet syndrome. She will be starting physical therapy for this, she may need injections as well going forward.    She reports that her blood pressure has been labile, highest being 144/70's. Discussed this is not worriesome at this time, if it is consistently higher than 140/90, then we will worry about this.    She does not want any immunizations today.     Healthy Habits:     Getting at least 3 servings of Calcium per day:  Yes    Bi-annual eye exam:  Yes    Dental care twice a year:  Yes    Sleep apnea or symptoms of sleep apnea:  None    Diet:  Regular (no restrictions)    Frequency of exercise:  1 day/week    Duration of exercise:  Less than 15 minutes    Taking medications regularly:  Yes    Medication side effects:  Other    PHQ-2 Total Score: 0    Additional concerns today:  Yes    Today's PHQ-2 Score:   PHQ-2 ( 1999 Pfizer) 10/5/2022   Q1: Little interest or pleasure in doing things 0   Q2: Feeling down, depressed or hopeless 0   PHQ-2 Score 0   Q1: Little interest or pleasure in doing things Not at all   Q2: Feeling down, depressed or hopeless Not at all   PHQ-2 Score 0       Abuse: Current or Past (Physical, Sexual or Emotional) - No  Do you feel safe in your environment? Yes    Have you ever done Advance Care  Planning? (For example, a Health Directive, POLST, or a discussion with a medical provider or your loved ones about your wishes): No, advance care planning information given to patient to review.  Patient plans to discuss their wishes with loved ones or provider.      Social History     Tobacco Use     Smoking status: Never Smoker     Smokeless tobacco: Never Used   Substance Use Topics     Alcohol use: No         Alcohol Use 10/5/2022   Prescreen: >3 drinks/day or >7 drinks/week? No     Reviewed orders with patient.  Reviewed health maintenance and updated orders accordingly - Yes  Lab work is in process    Breast Cancer Screening:    FHS-7:   Breast CA Risk Assessment (FHS-7) 10/5/2022   Did any of your first-degree relatives have breast or ovarian cancer? No   Did any of your relatives have bilateral breast cancer? Yes   Did any man in your family have breast cancer? No   Did any woman in your family have breast and ovarian cancer? Yes   Did any woman in your family have breast cancer before age 50 y? Unknown   Do you have 2 or more relatives with breast and/or ovarian cancer? No   Do you have 2 or more relatives with breast and/or bowel cancer? Unknown     Patient under 40 years of age: Routine Mammogram Screening not recommended.   Pertinent mammograms are reviewed under the imaging tab.    History of abnormal Pap smear: YES - GEOFFREY 2/3 on biopsy - PAP/HPV co-testing at 12, 24 months.  If two negative results repeat co-testing in 3 years, if negative then routine screening-through OBGYN.     Reviewed and updated as needed this visit by clinical staff   Tobacco  Allergies  Meds                Reviewed and updated as needed this visit by Provider                   Review of Systems   Constitutional: Negative for chills and fever.   HENT: Negative for congestion, ear pain, hearing loss and sore throat.    Eyes: Negative for pain and visual disturbance.   Respiratory: Negative for cough and shortness of breath.   "  Cardiovascular: Negative for chest pain, palpitations and peripheral edema.   Gastrointestinal: Negative for abdominal pain, constipation, diarrhea, heartburn, hematochezia and nausea.   Breasts:  Negative for tenderness, breast mass and discharge.   Genitourinary: Negative for dysuria, frequency, genital sores, hematuria, pelvic pain, urgency, vaginal bleeding and vaginal discharge.   Musculoskeletal: Negative for arthralgias, joint swelling and myalgias.   Neurological: Negative for dizziness, weakness, headaches and paresthesias.   Psychiatric/Behavioral: Negative for mood changes. The patient is not nervous/anxious.      CONSTITUTIONAL: NEGATIVE for fever, chills, change in weight  INTEGUMENTARU/SKIN: NEGATIVE for worrisome rashes, moles or lesions  EYES: NEGATIVE for vision changes or irritation  ENT: NEGATIVE for ear, mouth and throat problems  RESP: NEGATIVE for significant cough or SOB  BREAST: NEGATIVE for masses, tenderness or discharge  CV: NEGATIVE for chest pain, palpitations or peripheral edema  GI: NEGATIVE for nausea, abdominal pain, heartburn, or change in bowel habits  : NEGATIVE for unusual urinary or vaginal symptoms. Periods are regular.  MUSCULOSKELETAL: NEGATIVE for significant arthralgias or myalgia  NEURO: NEGATIVE for weakness, dizziness or paresthesias  PSYCHIATRIC: NEGATIVE for changes in mood or affect     OBJECTIVE:   BP 98/58 (BP Location: Right arm, Patient Position: Sitting, Cuff Size: Adult Regular)   Pulse 92   Ht 1.651 m (5' 5\")   Wt 63.5 kg (140 lb 1.6 oz)   SpO2 99%   BMI 23.31 kg/m    Physical Exam  GENERAL: healthy, alert and no distress  EYES: Eyes grossly normal to inspection, PERRL and conjunctivae and sclerae normal  HENT: ear canals and TM's normal, nose normal, tongue white coating, thick in nature is migrating down the back of her throat.   NECK: no adenopathy, no asymmetry, masses, or scars and thyroid normal to palpation  RESP: lungs clear to auscultation - " "no rales, rhonchi or wheezes  CV: regular rate and rhythm, normal S1 S2, no S3 or S4, no murmur, click or rub, no peripheral edema and peripheral pulses strong  ABDOMEN: soft, nontender, no hepatosplenomegaly, no masses and bowel sounds normal  MS: no gross musculoskeletal defects noted, no edema  SKIN: no suspicious lesions or rashes  NEURO: Normal strength and tone, mentation intact and speech normal  PSYCH: mentation appears normal, affect normal/bright    Labs reviewed in Epic    ASSESSMENT/PLAN:   Dasha was seen today for physical.    Diagnoses and all orders for this visit:    Encounter for annual physical exam: Completed today. Fasted labs ordered.     Thrush: Since 08/19; white coated tongue, is going down the back of her throat. Will treat for thrush with Nystatin swish and swallow.   -     nystatin (MYCOSTATIN) 129567 UNIT/ML suspension; Take 5 mLs (500,000 Units) by mouth 4 times daily for 7 days    Thoracic outlet syndrome: Upper back pain, arm pain. To have physical therapy, may need injections.     Screening for HIV (human immunodeficiency virus)  -     HIV Antigen Antibody Combo    Need for hepatitis C screening test  -     Hepatitis C Screen Reflex to HCV RNA Quant and Genotype    Screening for endocrine, nutritional, metabolic and immunity disorder  -     CBC with platelets and differential; Future  -     Comprehensive metabolic panel (BMP + Alb, Alk Phos, ALT, AST, Total. Bili, TP); Future  -     Lipid panel reflex to direct LDL Fasting; Future    COUNSELING:  Reviewed preventive health counseling, as reflected in patient instructions  Special attention given to:        Regular exercise       Healthy diet/nutrition       Vision screening    Estimated body mass index is 23.31 kg/m  as calculated from the following:    Height as of this encounter: 1.651 m (5' 5\").    Weight as of this encounter: 63.5 kg (140 lb 1.6 oz).        She reports that she has never smoked. She has never used smokeless " tobacco.      Counseling Resources:  ATP IV Guidelines  Pooled Cohorts Equation Calculator  Breast Cancer Risk Calculator  BRCA-Related Cancer Risk Assessment: FHS-7 Tool  FRAX Risk Assessment  ICSI Preventive Guidelines  Dietary Guidelines for Americans, 2010  USDA's MyPlate  ASA Prophylaxis  Lung CA Screening    Melany Pereyra CNP  [facility]

## 2022-10-10 ENCOUNTER — TELEPHONE (OUTPATIENT)
Dept: INTERNAL MEDICINE | Facility: CLINIC | Age: 37
End: 2022-10-10

## 2022-10-10 NOTE — TELEPHONE ENCOUNTER
----- Message from Melany Pereyra CNP sent at 10/5/2022  5:33 PM CDT -----  Please update the patient of her lab results as follows:    Her white count, red count, hemoglobin is normal.    She was negative for hepatitis C and HIV.    Her electrolytes, blood sugar, liver function was normal.    Her creatinine which measures kidney function was mildly elevated at 1.10, EGFR kidney filtration rate was normal at greater than 60.    Her blood sugar was mildly elevated at 101.    Her cholesterol was high. She should continue working on diet and exercise. Goals for exercise are 150 minutes per week, getting her heart rate up and breaking a sweat. Eating a heart healthy Mediterranean diet will also help.    We should recheck her kidney function in one month, lab only appointment to ensure stability.

## 2022-10-11 ENCOUNTER — TELEPHONE (OUTPATIENT)
Dept: INTERNAL MEDICINE | Facility: CLINIC | Age: 37
End: 2022-10-11

## 2022-10-11 DIAGNOSIS — K12.30 STOMATITIS AND MUCOSITIS: Primary | ICD-10-CM

## 2022-10-11 DIAGNOSIS — K12.1 STOMATITIS AND MUCOSITIS: Primary | ICD-10-CM

## 2022-10-11 RX ORDER — CHLORHEXIDINE GLUCONATE ORAL RINSE 1.2 MG/ML
15 SOLUTION DENTAL 2 TIMES DAILY
Qty: 473 ML | Refills: 1 | Status: SHIPPED | OUTPATIENT
Start: 2022-10-11

## 2022-10-11 NOTE — TELEPHONE ENCOUNTER
I transmitted prescription for chlorhexidine mouthwash (Peridex) to her [facility], swish and spit twice a day.

## 2022-10-11 NOTE — TELEPHONE ENCOUNTER
Patient is calling regarding oral Nystatin she was prescribed on 10/5/22 for possible oral thrush.  She states this was a trial med, and has had no relief with this.     She takes 3-4x per day - no relief.   Still has white tongue, dry mouth, discomfort.     She is not convinced it is a fungal infection.     She'd like advisement on if alternative Rx can be prescribed or if any other treatment should be considered.     Thanks!  Lanette HANNA

## 2022-10-13 NOTE — TELEPHONE ENCOUNTER
I made Dasha a lab appointment.   She has some questions about her results.   Please call her back on her cell   ?[phone]    Zuleima Mauro on 10/13/2022 at 11:55 AM

## 2022-10-13 NOTE — TELEPHONE ENCOUNTER
Outgoing Call:  Please see notes below    Korin Leblanc RN contacted Dasha on 10/13/22 and left a message. If patient calls back please route to any available RN    Korin ARAUZ RN  [facility]

## 2022-11-04 ENCOUNTER — TELEPHONE (OUTPATIENT)
Dept: INTERNAL MEDICINE | Facility: CLINIC | Age: 37
End: 2022-11-04

## 2022-11-04 NOTE — TELEPHONE ENCOUNTER
Pt calling about wantnig to get labs redone fr her kidney but has had new symptoms. Which include light and shorter period and more abdominal and back pain. Pt wanted to know if she needs to schedule an appt to be seen based on symptoms or if there are other tests that should be ran. Please call pt if there are more tests that need to be done or an appt is needed.

## 2022-11-04 NOTE — TELEPHONE ENCOUNTER
Pt has a lab appt on 11/14/2022 for a Basic profile. Should pt make an appt with OB/GYN or provider here at clinic/ thanks.

## 2022-11-07 ENCOUNTER — MYC MEDICAL ADVICE (OUTPATIENT)
Dept: INTERNAL MEDICINE | Facility: CLINIC | Age: 37
End: 2022-11-07

## 2022-11-14 ENCOUNTER — LAB (OUTPATIENT)
Dept: LAB | Facility: CLINIC | Age: 37
End: 2022-11-14
Payer: COMMERCIAL

## 2022-11-14 DIAGNOSIS — R79.89 ELEVATED SERUM CREATININE: ICD-10-CM

## 2022-11-14 LAB
ANION GAP SERPL CALCULATED.3IONS-SCNC: 10 MMOL/L (ref 7–15)
BUN SERPL-MCNC: 12.4 MG/DL (ref 6–20)
CALCIUM SERPL-MCNC: 9.1 MG/DL (ref 8.6–10)
CHLORIDE SERPL-SCNC: 102 MMOL/L (ref 98–107)
CREAT SERPL-MCNC: 1.08 MG/DL (ref 0.51–0.95)
DEPRECATED HCO3 PLAS-SCNC: 24 MMOL/L (ref 22–29)
GFR SERPL CREATININE-BSD FRML MDRD: 68 ML/MIN/1.73M2
GLUCOSE SERPL-MCNC: 102 MG/DL (ref 70–99)
POTASSIUM SERPL-SCNC: 4.1 MMOL/L (ref 3.4–5.3)
SODIUM SERPL-SCNC: 136 MMOL/L (ref 136–145)

## 2022-11-14 PROCEDURE — 36415 COLL VENOUS BLD VENIPUNCTURE: CPT

## 2022-11-14 PROCEDURE — 80048 BASIC METABOLIC PNL TOTAL CA: CPT

## 2022-11-16 NOTE — TELEPHONE ENCOUNTER
Left patient a detailed message that she should call back and make an appt to be seen if she is experiencing any new symptoms.

## 2023-01-16 ENCOUNTER — LAB REQUISITION (OUTPATIENT)
Dept: LAB | Facility: CLINIC | Age: 38
End: 2023-01-16

## 2023-01-16 DIAGNOSIS — Z11.3 ENCOUNTER FOR SCREENING FOR INFECTIONS WITH A PREDOMINANTLY SEXUAL MODE OF TRANSMISSION: ICD-10-CM

## 2023-01-16 DIAGNOSIS — R79.89 OTHER SPECIFIED ABNORMAL FINDINGS OF BLOOD CHEMISTRY: ICD-10-CM

## 2023-01-16 DIAGNOSIS — E78.00 PURE HYPERCHOLESTEROLEMIA, UNSPECIFIED: ICD-10-CM

## 2023-01-16 PROCEDURE — 80061 LIPID PANEL: CPT | Performed by: OBSTETRICS & GYNECOLOGY

## 2023-01-16 PROCEDURE — 87340 HEPATITIS B SURFACE AG IA: CPT | Performed by: OBSTETRICS & GYNECOLOGY

## 2023-01-16 PROCEDURE — 87491 CHLMYD TRACH DNA AMP PROBE: CPT | Performed by: OBSTETRICS & GYNECOLOGY

## 2023-01-16 PROCEDURE — 82565 ASSAY OF CREATININE: CPT | Performed by: OBSTETRICS & GYNECOLOGY

## 2023-01-16 PROCEDURE — 86780 TREPONEMA PALLIDUM: CPT | Performed by: OBSTETRICS & GYNECOLOGY

## 2023-01-17 LAB
C TRACH DNA SPEC QL PROBE+SIG AMP: NEGATIVE
CHOLEST SERPL-MCNC: 235 MG/DL
CREAT SERPL-MCNC: 1.22 MG/DL (ref 0.51–0.95)
GFR SERPL CREATININE-BSD FRML MDRD: 59 ML/MIN/1.73M2
HBV SURFACE AG SERPL QL IA: NONREACTIVE
HDLC SERPL-MCNC: 62 MG/DL
LDLC SERPL CALC-MCNC: 156 MG/DL
N GONORRHOEA DNA SPEC QL NAA+PROBE: NEGATIVE
NONHDLC SERPL-MCNC: 173 MG/DL
T PALLIDUM AB SER QL: NONREACTIVE
TRIGL SERPL-MCNC: 84 MG/DL

## 2023-02-01 ENCOUNTER — OFFICE VISIT (OUTPATIENT)
Dept: CARDIOLOGY | Facility: CLINIC | Age: 38
End: 2023-02-01
Payer: COMMERCIAL

## 2023-02-01 VITALS
SYSTOLIC BLOOD PRESSURE: 120 MMHG | RESPIRATION RATE: 16 BRPM | HEART RATE: 80 BPM | BODY MASS INDEX: 23.47 KG/M2 | WEIGHT: 140.9 LBS | HEIGHT: 65 IN | DIASTOLIC BLOOD PRESSURE: 62 MMHG | OXYGEN SATURATION: 99 %

## 2023-02-01 DIAGNOSIS — N18.31 STAGE 3A CHRONIC KIDNEY DISEASE (H): Primary | ICD-10-CM

## 2023-02-01 DIAGNOSIS — R07.89 OTHER CHEST PAIN: ICD-10-CM

## 2023-02-01 PROCEDURE — 99204 OFFICE O/P NEW MOD 45 MIN: CPT | Performed by: INTERNAL MEDICINE

## 2023-02-01 NOTE — PROGRESS NOTES
"  HEART CARE ENCOUNTER CONSULTATON NOTE      [facility]  [phone]      Assessment/Recommendations   Assessment:  1.  Chest pain: Atypical chest discomfort with constellation of various symptoms.  Discussed with patient and given dyslipidemia and family history we will proceed with exercise stress echo for further evaluation.  2.  Dyslipidemia: At this point she is still low risk and would not recommend statin therapy at this time.  Could consider CT calcium scanning for further risk assessment.  She would like to hold off at this time.  3.  Chronic kidney disease: Notable increase in creatinine over the past few months unknown etiology and will refer to nephrology for further evaluation.  Plan:  1.  Exercise stress echocardiogram  2.  Continue to follow lipids, continue with regular exercise and diet  3.  Consider CT coronary calcium scoring  4.  Referral to nephrology given worsening kidney function noted over the past few months.       History of Present Illness/Subjective    HPI: Dasha Leonard is a 37 year old female with history of dyslipidemia who I am seeing today for initial consultation due to concerning symptoms that have been ongoing for about a year now.  Last January 2022 she was eating a meal with her family when all of a sudden she felt discomfort that felt like a \"brain freeze\" in her chest area to her left side.  It resolved but then she noticed after that she had various symptoms she noticed some intermittent arm weakness left greater than right.  She tested positive for COVID about a week later.  At the end of February she started noticing bad headaches.  They would last for about a minute with severe pain.  She also complains of intermittent nausea and pain over the side of her body.  Extends from the left upper shoulder all the way down to the left hip area and she also has pain in the upper back.  She was diagnosed with thoracic outlet syndrome on work-up as well as " "carpal tunnel syndrome.  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  She has twins are 9 years old and another daughter that is 5 years old.  She is active.  She walks about 7-10,000 steps a day.  She plays volleyball and broom ball.  She denies any exertional symptoms.  Twelve-lead EKG done on 4/13/2022 shows normal sinus rhythm at 73 bpm.  Her father passed away at age 74.  At that time he was diagnosed with cardiomyopathy.  He also had a history of lung cancer with lobectomy and pneumonia.  No history of smoking.       Physical Examination  Review of Systems   Vitals: /62 (BP Location: Right arm, Patient Position: Sitting, Cuff Size: Adult Regular)   Pulse 80   Resp 16   Ht 1.638 m (5' 4.5\")   Wt 63.9 kg (140 lb 14.4 oz)   SpO2 99%   BMI 23.81 kg/m    BMI= Body mass index is 23.81 kg/m .  Wt Readings from Last 3 Encounters:   02/01/23 63.9 kg (140 lb 14.4 oz)   10/05/22 63.5 kg (140 lb 1.6 oz)   07/18/22 64 kg (141 lb 1.6 oz)       General Appearance:   no distress, normal body habitus   ENT/Mouth: membranes moist, no oral lesions or bleeding gums.      EYES:  no scleral icterus, normal conjunctivae   Neck: no carotid bruits or thyromegaly   Chest/Lungs:   lungs are clear to auscultation   Cardiovascular:   Regular. Normal first and second heart sounds with no murmurs  no edema bilaterally    Abdomen:  no organomegaly, masses, bruits, or tenderness; bowel sounds are present   Extremities: no cyanosis or clubbing   Skin: no xanthelasma, warm.    Neurologic: normal  bilateral, no tremors     Psychiatric: alert and oriented x3, calm        Please refer above for cardiac ROS details.        Medical History  Surgical History Family History Social History   Possible thoracic outlet syndrome    Dyslipidemia No past surgical history on file.  No family history of premature coronary artery disease   Social History     Socioeconomic History     Marital status: Single     Spouse name: Not on file "     Number of children: Not on file     Years of education: Not on file     Highest education level: Not on file   Occupational History     Not on file   Tobacco Use     Smoking status: Never     Smokeless tobacco: Never   Substance and Sexual Activity     Alcohol use: No     Drug use: No     Sexual activity: Not on file   Other Topics Concern     Not on file   Social History Narrative     Not on file     Social Determinants of Health     Financial Resource Strain: Not on file   Food Insecurity: Not on file   Transportation Needs: Not on file   Physical Activity: Not on file   Stress: Not on file   Social Connections: Not on file   Intimate Partner Violence: Not on file   Housing Stability: Not on file           Medications  Allergies   Current Outpatient Medications   Medication Sig Dispense Refill     KURVELO 0.15-30 MG-MCG tablet Take 1 tablet by mouth daily       chlorhexidine (PERIDEX) 0.12 % solution Swish and spit 15 mLs in mouth 2 times daily (Patient not taking: Reported on 2/1/2023) 473 mL 1       Allergies   Allergen Reactions     Sulfa (Sulfonamide Antibiotics) [Sulfa Drugs] Rash          Lab Results    Chemistry/lipid CBC Cardiac Enzymes/BNP/TSH/INR   Recent Labs   Lab Test 01/16/23  1505   CHOL 235*   HDL 62   *   TRIG 84     Recent Labs   Lab Test 01/16/23  1505 10/05/22  0732   * 169*     Recent Labs   Lab Test 01/16/23  1505 11/14/22  0703   NA  --  136   POTASSIUM  --  4.1   CHLORIDE  --  102   CO2  --  24   GLC  --  102*   BUN  --  12.4   CR 1.22* 1.08*   GFRESTIMATED 59* 68   ALLAN  --  9.1     Recent Labs   Lab Test 01/16/23  1505 11/14/22  0703 10/05/22  0732   CR 1.22* 1.08* 1.10*     No results for input(s): A1C in the last 32400 hours.       Recent Labs   Lab Test 10/05/22  0732   WBC 4.3   HGB 15.1   HCT 46.2   MCV 91        Recent Labs   Lab Test 10/05/22  0732 03/31/22  1705   HGB 15.1 13.2    No results for input(s): TROPONINI in the last 04039 hours.  No results for  input(s): BNP, NTBNPI, NTBNP in the last 93774 hours.  No results for input(s): TSH in the last 87843 hours.  No results for input(s): INR in the last 97336 hours.     Rhianna Allen MD

## 2023-02-01 NOTE — LETTER
"2/1/2023    Melany Pereyra, CNP  [address] [place] [address]    RE: Dasha Leonard       Dear Colleague,     I had the pleasure of seeing Dasha Leonard in the [facility].    HEART CARE ENCOUNTER CONSULTATON NOTE      [facility]  [phone]      Assessment/Recommendations   Assessment:  1.  Chest pain: Atypical chest discomfort with constellation of various symptoms.  Discussed with patient and given dyslipidemia and family history we will proceed with exercise stress echo for further evaluation.  2.  Dyslipidemia: At this point she is still low risk and would not recommend statin therapy at this time.  Could consider CT calcium scanning for further risk assessment.  She would like to hold off at this time.  3.  Chronic kidney disease: Notable increase in creatinine over the past few months unknown etiology and will refer to nephrology for further evaluation.  Plan:  1.  Exercise stress echocardiogram  2.  Continue to follow lipids, continue with regular exercise and diet  3.  Consider CT coronary calcium scoring  4.  Referral to nephrology given worsening kidney function noted over the past few months.       History of Present Illness/Subjective    HPI: Dasha Leonard is a 37 year old female with history of dyslipidemia who I am seeing today for initial consultation due to concerning symptoms that have been ongoing for about a year now.  Last January 2022 she was eating a meal with her family when all of a sudden she felt discomfort that felt like a \"brain freeze\" in her chest area to her left side.  It resolved but then she noticed after that she had various symptoms she noticed some intermittent arm weakness left greater than right.  She tested positive for COVID about a week later.  At the end of February she started noticing bad headaches.  They would last for about a minute with severe pain.  She also complains of intermittent nausea and pain over " "the side of her body.  Extends from the left upper shoulder all the way down to the left hip area and she also has pain in the upper back.  She was diagnosed with thoracic outlet syndrome on work-up as well as carpal tunnel syndrome.  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  She has twins are 9 years old and another daughter that is 5 years old.  She is active.  She walks about 7-10,000 steps a day.  She plays volleyball and broom ball.  She denies any exertional symptoms.  Twelve-lead EKG done on 4/13/2022 shows normal sinus rhythm at 73 bpm.  Her father passed away at age 74.  At that time he was diagnosed with cardiomyopathy.  He also had a history of lung cancer with lobectomy and pneumonia.  No history of smoking.       Physical Examination  Review of Systems   Vitals: /62 (BP Location: Right arm, Patient Position: Sitting, Cuff Size: Adult Regular)   Pulse 80   Resp 16   Ht 1.638 m (5' 4.5\")   Wt 63.9 kg (140 lb 14.4 oz)   SpO2 99%   BMI 23.81 kg/m    BMI= Body mass index is 23.81 kg/m .  Wt Readings from Last 3 Encounters:   02/01/23 63.9 kg (140 lb 14.4 oz)   10/05/22 63.5 kg (140 lb 1.6 oz)   07/18/22 64 kg (141 lb 1.6 oz)       General Appearance:   no distress, normal body habitus   ENT/Mouth: membranes moist, no oral lesions or bleeding gums.      EYES:  no scleral icterus, normal conjunctivae   Neck: no carotid bruits or thyromegaly   Chest/Lungs:   lungs are clear to auscultation   Cardiovascular:   Regular. Normal first and second heart sounds with no murmurs  no edema bilaterally    Abdomen:  no organomegaly, masses, bruits, or tenderness; bowel sounds are present   Extremities: no cyanosis or clubbing   Skin: no xanthelasma, warm.    Neurologic: normal  bilateral, no tremors     Psychiatric: alert and oriented x3, calm        Please refer above for cardiac ROS details.        Medical History  Surgical History Family History Social History   Possible thoracic outlet " syndrome    Dyslipidemia No past surgical history on file.  No family history of premature coronary artery disease   Social History     Socioeconomic History     Marital status: Single     Spouse name: Not on file     Number of children: Not on file     Years of education: Not on file     Highest education level: Not on file   Occupational History     Not on file   Tobacco Use     Smoking status: Never     Smokeless tobacco: Never   Substance and Sexual Activity     Alcohol use: No     Drug use: No     Sexual activity: Not on file   Other Topics Concern     Not on file   Social History Narrative     Not on file     Social Determinants of Health     Financial Resource Strain: Not on file   Food Insecurity: Not on file   Transportation Needs: Not on file   Physical Activity: Not on file   Stress: Not on file   Social Connections: Not on file   Intimate Partner Violence: Not on file   Housing Stability: Not on file           Medications  Allergies   Current Outpatient Medications   Medication Sig Dispense Refill     KURVELO 0.15-30 MG-MCG tablet Take 1 tablet by mouth daily       chlorhexidine (PERIDEX) 0.12 % solution Swish and spit 15 mLs in mouth 2 times daily (Patient not taking: Reported on 2/1/2023) 473 mL 1       Allergies   Allergen Reactions     Sulfa (Sulfonamide Antibiotics) [Sulfa Drugs] Rash          Lab Results    Chemistry/lipid CBC Cardiac Enzymes/BNP/TSH/INR   Recent Labs   Lab Test 01/16/23  1505   CHOL 235*   HDL 62   *   TRIG 84     Recent Labs   Lab Test 01/16/23  1505 10/05/22  0732   * 169*     Recent Labs   Lab Test 01/16/23  1505 11/14/22  0703   NA  --  136   POTASSIUM  --  4.1   CHLORIDE  --  102   CO2  --  24   GLC  --  102*   BUN  --  12.4   CR 1.22* 1.08*   GFRESTIMATED 59* 68   ALLAN  --  9.1     Recent Labs   Lab Test 01/16/23  1505 11/14/22  0703 10/05/22  0732   CR 1.22* 1.08* 1.10*     No results for input(s): A1C in the last 89020 hours.       Recent Labs   Lab Test  10/05/22  0732   WBC 4.3   HGB 15.1   HCT 46.2   MCV 91        Recent Labs   Lab Test 10/05/22  0732 03/31/22  1705   HGB 15.1 13.2    No results for input(s): TROPONINI in the last 38374 hours.  No results for input(s): BNP, NTBNPI, NTBNP in the last 64154 hours.  No results for input(s): TSH in the last 55722 hours.  No results for input(s): INR in the last 37275 hours.     Rhianna Allen MD    Thank you for allowing me to participate in the care of your patient.      Sincerely,     Rhianna Allen MD   [facility]  cc: No referring provider defined for this encounter.

## 2023-02-07 ENCOUNTER — HOSPITAL ENCOUNTER (OUTPATIENT)
Dept: CARDIOLOGY | Facility: CLINIC | Age: 38
Discharge: HOME | End: 2023-02-07
Attending: INTERNAL MEDICINE | Admitting: INTERNAL MEDICINE
Payer: COMMERCIAL

## 2023-02-07 DIAGNOSIS — R07.89 OTHER CHEST PAIN: ICD-10-CM

## 2023-02-07 PROCEDURE — 93325 DOPPLER ECHO COLOR FLOW MAPG: CPT | Mod: TC

## 2023-02-07 PROCEDURE — 93325 DOPPLER ECHO COLOR FLOW MAPG: CPT | Mod: 26 | Performed by: INTERNAL MEDICINE

## 2023-02-07 PROCEDURE — 93350 STRESS TTE ONLY: CPT | Mod: 26 | Performed by: INTERNAL MEDICINE

## 2023-02-07 PROCEDURE — 93321 DOPPLER ECHO F-UP/LMTD STD: CPT | Mod: 26 | Performed by: INTERNAL MEDICINE

## 2023-02-07 PROCEDURE — 93018 CV STRESS TEST I&R ONLY: CPT | Performed by: INTERNAL MEDICINE

## 2023-02-07 PROCEDURE — 93350 STRESS TTE ONLY: CPT | Mod: TC

## 2023-02-07 PROCEDURE — 93016 CV STRESS TEST SUPVJ ONLY: CPT | Performed by: INTERNAL MEDICINE

## 2023-02-08 ENCOUNTER — APPOINTMENT (OUTPATIENT)
Dept: CT IMAGING | Facility: CLINIC | Age: 38
End: 2023-02-08
Attending: EMERGENCY MEDICINE
Payer: COMMERCIAL

## 2023-02-08 ENCOUNTER — NURSE TRIAGE (OUTPATIENT)
Dept: NURSING | Facility: CLINIC | Age: 38
End: 2023-02-08
Payer: COMMERCIAL

## 2023-02-08 ENCOUNTER — HOSPITAL ENCOUNTER (EMERGENCY)
Facility: CLINIC | Age: 38
Discharge: HOME | End: 2023-02-08
Attending: EMERGENCY MEDICINE | Admitting: EMERGENCY MEDICINE
Payer: COMMERCIAL

## 2023-02-08 VITALS
RESPIRATION RATE: 18 BRPM | WEIGHT: 141 LBS | DIASTOLIC BLOOD PRESSURE: 58 MMHG | TEMPERATURE: 99.8 F | OXYGEN SATURATION: 99 % | HEIGHT: 64 IN | BODY MASS INDEX: 24.07 KG/M2 | SYSTOLIC BLOOD PRESSURE: 107 MMHG | HEART RATE: 72 BPM

## 2023-02-08 DIAGNOSIS — S06.0X1A CONCUSSION WITH LOSS OF CONSCIOUSNESS OF 30 MINUTES OR LESS, INITIAL ENCOUNTER: ICD-10-CM

## 2023-02-08 PROCEDURE — 99284 EMERGENCY DEPT VISIT MOD MDM: CPT | Mod: 25

## 2023-02-08 PROCEDURE — 70450 CT HEAD/BRAIN W/O DYE: CPT

## 2023-02-08 ASSESSMENT — ACTIVITIES OF DAILY LIVING (ADL): ADLS_ACUITY_SCORE: 35

## 2023-02-08 ASSESSMENT — ENCOUNTER SYMPTOMS
WEAKNESS: 0
VOMITING: 0
NAUSEA: 1
BACK PAIN: 0
NUMBNESS: 0
NECK PAIN: 1

## 2023-02-08 NOTE — ED PROVIDER NOTES
EMERGENCY DEPARTMENT ENCOUNTER      NAME: Dasha Leonard  AGE: 38 year old female  YOB: 1985  MRN: [id number]  EVALUATION DATE & TIME: 2/8/2023  2:48 AM    PCP: Melany Pereyra    ED PROVIDER: Pk Cazares M.D.      Chief Complaint   Patient presents with     Headache         FINAL IMPRESSION:  1. Concussion with loss of consciousness of 30 minutes or less, initial encounter          ED COURSE & MEDICAL DECISION MAKING:    Pertinent Labs & Imaging studies reviewed. (See chart for details)  38 year old female presents to the Emergency Department for evaluation of head injury.  Patient hit her head while playing volleyball.  Did have a brief loss conscious.  After discussion with her she did opt to get a CT scan.  This is normal.  She has a normal neurologic exam.  Does have symptoms of concussion.  Discussed these findings with her.  Did refer to the concussion clinic.  She will return for any worsening symptoms    2:51 AM I met with the patient to gather history and to perform my initial exam. I discussed the plan for care while in the Emergency Department. PPE: PPE worn including surgical mask, surgical gloves    At the conclusion of the encounter I discussed the results of all of the tests and the disposition. The questions were answered. The patient or family acknowledged understanding and was agreeable with the care plan.     Medical Decision Making    History:    Supplemental history from: Documented in chart, if applicable    External Record(s) reviewed: Documented in chart, if applicable.    Work Up:    Chart documentation includes differential considered and any EKGs or imaging independently interpreted by provider, where specified.    In additional to work up documented, I considered the following work up: Documented in chart, if applicable.    External consultation:    Discussion of management with another provider: Documented in chart, if applicable    Complicating factors:    Care  "impacted by chronic illness: N/A    Care affected by social determinants of health: N/A    Disposition considerations: Discharge. No recommendations on prescription strength medication(s). N/A.             MEDICATIONS GIVEN IN THE EMERGENCY:  Medications - No data to display    NEW PRESCRIPTIONS STARTED AT TODAY'S ER VISIT  Discharge Medication List as of 2/8/2023  4:52 AM             =================================================================    HPI    Patient information was obtained from: Patient    Use of : N/A        Dasha Leonard is a 38 year old female with no recorded pertinent medical history who presents to this ED by walk in for evaluation of fall. Patient reports she was playing volleyball earlier tonight at ~9:15 PM when she dove for a ball and \"rolled funny\" during this. Patient does not remember much from this fall, but states she does remember getting up from the ground. At this time, she reports having some left leg tingling, but this has since resolved. After her fall, patient states witnesses told her she hit the back of her head while rolling on the ground. Patient was told she did lay on the ground for a brief period of time and then got up on her own. Patient unsure if she lost consciousness. She currently endorses mild posterior neck pain.    Patient states she currently endorses some mild nausea, but states she has been having a variety of ongoing symptoms for the past year, which include nausea and could be due to that. Patient states she has been evaluated in the past for these symptoms and states she has had a brain MRI in the past.    No gait difficulties, weakness or numbness to her extremities, back pain, visual changes. No other reported complaints at this time      REVIEW OF SYSTEMS   Review of Systems   Eyes: Negative for visual disturbance.   Gastrointestinal: Positive for nausea. Negative for vomiting.   Musculoskeletal: Positive for neck pain (posterior). " "Negative for back pain and gait problem.   Neurological: Negative for weakness and numbness.        PAST MEDICAL HISTORY:  History reviewed. No pertinent past medical history.    PAST SURGICAL HISTORY:  History reviewed. No pertinent surgical history.        CURRENT MEDICATIONS:    No current facility-administered medications for this encounter.     Current Outpatient Medications   Medication     chlorhexidine (PERIDEX) 0.12 % solution     KURVELO 0.15-30 MG-MCG tablet         ALLERGIES:  Allergies   Allergen Reactions     Sulfa (Sulfonamide Antibiotics) [Sulfa Drugs] Rash       FAMILY HISTORY:  History reviewed. No pertinent family history.    SOCIAL HISTORY:   Social History     Socioeconomic History     Marital status: Single   Tobacco Use     Smoking status: Never     Smokeless tobacco: Never   Substance and Sexual Activity     Alcohol use: No     Drug use: No       VITALS:  /58   Pulse 72   Temp 99.8  F (37.7  C) (Temporal)   Resp 18   Ht 1.626 m (5' 4\")   Wt 64 kg (141 lb)   SpO2 99%   BMI 24.20 kg/m      PHYSICAL EXAM    Physical Exam  Vitals and nursing note reviewed.   Constitutional:       General: She is not in acute distress.     Appearance: She is not diaphoretic.   HENT:      Head: Atraumatic.      Mouth/Throat:      Pharynx: No oropharyngeal exudate.   Eyes:      General: No scleral icterus.     Pupils: Pupils are equal, round, and reactive to light.   Cardiovascular:      Rate and Rhythm: Normal rate and regular rhythm.      Heart sounds: Normal heart sounds.   Pulmonary:      Effort: No respiratory distress.      Breath sounds: Normal breath sounds.   Abdominal:      Palpations: Abdomen is soft.      Tenderness: There is no abdominal tenderness. There is no guarding or rebound. Negative signs include Macias's sign.   Musculoskeletal:         General: No tenderness.   Skin:     General: Skin is warm.      Findings: No rash.   Neurological:      General: No focal deficit present.      " Mental Status: She is alert.      Comments: 5 out of 5 strength in bilateral upper and lower extremities.  Sensation intact in all 4 extremes.  Cranial nerves intact.  No pronator drift             LAB:  All pertinent labs reviewed and interpreted.  Labs Ordered and Resulted from Time of ED Arrival to Time of ED Departure - No data to display    RADIOLOGY:  Reviewed all pertinent imaging. Please see official radiology report.  CT Head w/o Contrast   Final Result   IMPRESSION:   1.  No acute intracranial process.          I, Dieter Hope, am serving as a scribe to document services personally performed by Dr. Pk Cazares, based on my observation and the provider's statements to me. I, Pk Cazares MD attest that Dieter Hope is acting in a scribe capacity, has observed my performance of the services and has documented them in accordance with my direction.    Pk Cazares M.D.  Emergency Medicine  [facility] EMERGENCY ROOM  [address] [id number] [phone]  Dept: [phone]     Pk Cazares MD  02/08/23 0620

## 2023-02-08 NOTE — ED TRIAGE NOTES
Was playing volleyball, dove into the ground and rolled. Reports hitting back of head. Called nurse line, was told to come in for concussion rule out. Unsure if lost consciousness. No blood thinners.      Triage Assessment     Row Name 02/08/23 0246       Triage Assessment (Adult)    Airway WDL WDL       Respiratory WDL    Respiratory WDL WDL       Skin Circulation/Temperature WDL    Skin Circulation/Temperature WDL WDL       Cardiac WDL    Cardiac WDL WDL       Peripheral/Neurovascular WDL    Peripheral Neurovascular WDL WDL       Cognitive/Neuro/Behavioral WDL    Cognitive/Neuro/Behavioral WDL X  headache       Rachel Coma Scale    Best Eye Response 4-->(E4) spontaneous    Best Motor Response 6-->(M6) obeys commands    Best Verbal Response 5-->(V5) oriented    Rachel Coma Scale Score 15

## 2023-02-08 NOTE — TELEPHONE ENCOUNTER
Dasha believes she may have a Concussion    ~9:15 pm - She fell and hit the back of her head when she was playing volleyball    The only thing she remembers is hitting the back of her head.    She was told  - She dove for a ball, went down and rolled, hitting the back of her head  - Initially couldn't see anything  - Unsure how long she was down or how long she couldn't see.    Currently has a headache    ER advised  Dasha states that she will need to try to make arrangements for someone to care for her 4 yr old and for transportation     Savanna Melchor RN  [facility]      Reason for Disposition    Can't remember what happened (amnesia)    Additional Information    Negative: [1] ACUTE NEURO SYMPTOM AND [2] present now  (DEFINITION: difficult to awaken OR confused thinking and talking OR slurred speech OR weakness of arms OR unsteady walking)    Negative: Knocked out (unconscious) > 1 minute    Negative: Seizure (convulsion) occurred  (Exception: prior history of seizures and now alert and without Acute Neuro Symptoms)    Negative: Penetrating head injury (e.g., knife, gun shot wound, metal object)    Negative: [1] Major bleeding (e.g., actively dripping or spurting) AND [2] can't be stopped    Negative: [1] Dangerous mechanism of injury (e.g., MVA, diving, trampoline, contact sports, fall > 10 feet or 3 meters) AND [2] NECK pain AND [3] began < 1 hour after injury    Negative: Sounds like a life-threatening emergency to the triager    Protocols used: HEAD INJURY-A-

## 2023-02-10 ENCOUNTER — TELEPHONE (OUTPATIENT)
Dept: INTERNAL MEDICINE | Facility: CLINIC | Age: 38
End: 2023-02-10
Payer: COMMERCIAL

## 2023-02-10 ENCOUNTER — TELEPHONE (OUTPATIENT)
Dept: CARDIOLOGY | Facility: CLINIC | Age: 38
End: 2023-02-10
Payer: COMMERCIAL

## 2023-02-10 DIAGNOSIS — I95.1 ORTHOSTATIC HYPOTENSION: Primary | ICD-10-CM

## 2023-02-10 NOTE — TELEPHONE ENCOUNTER
"S-(situation): patient requesting a follow up to ED visit on 2/8/2023 for Concussion with loss of consciousness     B-(background): patient was seen in ED on 2/8/2023 for Concussion with loss of consciousness and advised to follow up around 2/15/2023 with PCP.    A-(assessment): patient has noticed some tingling in arms and legs since concussion. Patient states she was told by her volleyball co-player her \"dive for the ball\" did not look like a typical dive for her.  Patient wonders if she had low blood pressure prior to and possible cause of the fall. (message was sent by DAVONTE Fonseca RN to Cardiologist JOSE Allen also).     R-(recommendations): Advised request for work into scheudle would be sent to PCP.    First available office visit is for a Virtual Visit on 2/17/2023.    Please advise patient.    [phone] detailed message okay.     " Problem: Mobility Impaired (Adult and Pediatric)  Goal: *Acute Goals and Plan of Care (Insert Text)  Description: FUNCTIONAL STATUS PRIOR TO ADMISSION: Patient was modified independent using a single point cane for functional mobility. HOME SUPPORT PRIOR TO ADMISSION: The patient lived alone with no local support. Physical Therapy Goals  Initiated 7/1/2020  1. Patient will move from supine to sit and sit to supine  in bed with independence within 7 day(s). 2.  Patient will transfer from bed to chair and chair to bed with modified independence using the least restrictive device within 7 day(s). 3.  Patient will perform sit to stand with modified independence within 7 day(s). 4.  Patient will ambulate with modified independence for 150 feet with the least restrictive device within 7 day(s). 5.  Patient will ascend/descend 4 stairs with 1 handrail(s) with modified independence within 7 day(s). Outcome: Progressing Towards Goal   PHYSICAL THERAPY EVALUATION  Patient: Ailyn Khalil (50 y.o. male)  Date: 7/1/2020  Primary Diagnosis: Weakness [R53.1]        Precautions: Fall         ASSESSMENT  Based on the objective data described below, the patient presents with decreased independence with functional mobility S/P admission for weakness. Per chart review patient was recently seen in ED and D/C'd home with nash cathter and antibiotics. He reports when he returned home he was too weak to be able to manage his care and called EMS to return to the hospital. On evaluation patient reports nausea and feelings of malaise. He is initially agreeable to completing his morning routine in the restroom but reports fatigue once seated EOB. VSS with positional changes. Patient performs sit to stand from bed to chair and is left with legs elevated in recliner chair. He demonstrates decreased gait speed with difficulty clearing bilateral LE and shuffling slow gait speed.      Current Level of Function Impacting Discharge (mobility/balance): Min A with RW limited distance     Other factors to consider for discharge: medical stability, decreased independence, increased need for assistance, cognitive status is questionable      Patient will benefit from skilled therapy intervention to address the above noted impairments. PLAN :  Recommendations and Planned Interventions: bed mobility training, transfer training, gait training, therapeutic exercises, neuromuscular re-education, edema management/control, patient and family training/education, and therapeutic activities      Frequency/Duration: Patient will be followed by physical therapy:  5 times a week to address goals. Recommendation for discharge: (in order for the patient to meet his/her long term goals)  Therapy up to 5 days/week in SNF setting to LTC    This discharge recommendation:  Has not yet been discussed the attending provider and/or case management    IF patient discharges home will need the following DME: to be determined (TBD)         SUBJECTIVE:   Patient stated Erin Fuentes had to crawl inside the house.     OBJECTIVE DATA SUMMARY:   HISTORY:    Past Medical History:   Diagnosis Date    Arthritis     BPH (benign prostatic hypertrophy) with urinary retention     Cataract 12/10/14    Dr. Balta Earl    Chronic pain     LOWER BACK AND RT.  HIP, NECK    Coronary atherosclerosis of native coronary artery 6/11/2009    Dr. iLng Car    Depression 6/11/2009    Essential hypertension, benign 6/11/2009    GERD (gastroesophageal reflux disease)     Hypertension     Hypertrophy of prostate without urinary obstruction and other lower urinary tract symptoms (LUTS) 6/11/2009    IBS (irritable bowel syndrome) 11/4/2011    ILD (interstitial lung disease) ([facility] [address] 8/12/2016    Dora Grier NP ([facility])    Impotence of organic origin 2005    Intentional drug overdose ([facility] [address] 6/12/2018    Other and unspecified alcohol dependence, unspecified drinking behavior 6/11/2009    PPD positive 2/2015?    not treated    Reflux esophagitis 6/11/2009    Tobacco use disorder 6/11/2009    Type II or unspecified type diabetes mellitus without mention of complication, not stated as uncontrolled 6/11/2009    Unspecified vitamin D deficiency 6/11/2009     Past Surgical History:   Procedure Laterality Date    CARDIAC SURG PROCEDURE UNLIST  5/07    Prox. LAD & distal LAD    CARDIAC SURG PROCEDURE UNLIST  March 2016    Stent     ENDOSCOPY, COLON, DIAGNOSTIC  [id number]    normal per patient    HX APPENDECTOMY  1975    HX CORONARY STENT PLACEMENT  3/8    [facility] mid RCA stent    HX GI      COLONOSCOPY    HX GI      ENDOSCOPY    HX ORTHOPAEDIC  2008    Cervical Fussion    LAMINECTOMY,LUMBAR  12/2011    Dr. Serena Adams       Personal factors and/or comorbidities impacting plan of care: please see above    Home Situation  Home Environment: Trailer/mobile home  # Steps to Enter: 4  Rails to Enter: Yes  Hand Rails : Bilateral  Wheelchair Ramp: Yes  One/Two Story Residence: One story  Living Alone: Yes  Support Systems: Family member(s)  Patient Expects to be Discharged to[de-identified] Skilled nursing facility  Current DME Used/Available at Home: Walker, rolling, Wheelchair, Cane, straight  Tub or Shower Type: Shower    EXAMINATION/PRESENTATION/DECISION MAKING:   Critical Behavior:  Neurologic State: Alert(periodic confusion)  Orientation Level: Oriented X4  Cognition: Follows commands     Hearing:   Auditory  Auditory Impairment: None  Skin:    Edema:   Range Of Motion:  AROM: Within functional limits           PROM: Within functional limits           Strength:    Strength: Generally decreased, functional                    Tone & Sensation:                                  Coordination:  Coordination: Generally decreased, functional  Vision:      Functional Mobility:  Bed Mobility:     Supine to Sit: Stand-by assistance     Scooting: Stand-by assistance  Transfers:  Sit to Stand: Minimum assistance  Stand to Sit: Minimum assistance  Stand Pivot Transfers: Minimum assistance                    Balance:   Sitting: Intact; With support  Standing: Impaired; With support  Standing - Static: Constant support; Fair  Standing - Dynamic : Constant support; Fair  Ambulation/Gait Training:  Distance (ft): 5 Feet (ft)  Assistive Device: Walker, rolling;Gait belt  Ambulation - Level of Assistance: Minimal assistance        Gait Abnormalities: Decreased step clearance;Trunk sway increased        Base of Support: Widened     Speed/Stephanie: Slow;Shuffled  Step Length: Right shortened;Left shortened                     Stairs: Therapeutic Exercises:              Physical Therapy Evaluation Charge Determination   History Examination Presentation Decision-Making   MEDIUM  Complexity : 1-2 comorbidities / personal factors will impact the outcome/ POC  LOW Complexity : 1-2 Standardized tests and measures addressing body structure, function, activity limitation and / or participation in recreation  MEDIUM Complexity : Evolving with changing characteristics  Other outcome measures    MEDIUM      Based on the above components, the patient evaluation is determined to be of the following complexity level: MEDIUM    Pain Rating:        Activity Tolerance:   Fair  Please refer to the flowsheet for vital signs taken during this treatment. After treatment patient left in no apparent distress:   Sitting in chair and Call bell within reach    COMMUNICATION/EDUCATION:   The patients plan of care was discussed with: Occupational therapist and Registered nurse. Fall prevention education was provided and the patient/caregiver indicated understanding., Patient/family have participated as able in goal setting and plan of care. , and Patient/family agree to work toward stated goals and plan of care.     Thank you for this referral.  Destiney Browning, PT   Time Calculation: 24 mins

## 2023-02-10 NOTE — TELEPHONE ENCOUNTER
Reached out to patient with recommendations and follow up arranged.  Patient wears compressions stockings 3 times a week while playing Volleyball, adds salt to food already and drinks 64 ounces of water daily.  Assured patient an update will be forwarded to Dr. Allen in hope of getting a return call.    Follow up not scheduled per appt notes until patient can discuss with .

## 2023-02-10 NOTE — TELEPHONE ENCOUNTER
[facility] Call Center    Phone Message    May a detailed message be left on voicemail: yes     Reason for Call: Other: Patient was seen in ER and was told had a concussion, patient does not remember if she hit her head when passed out, patient can not remember what happened before she passed out and would like to discuss with her care team. Please reach out to patient at [phone] to discuss.     Action Taken: Other: Cardiology    Travel Screening: Not Applicable     Thank you!  [facility]

## 2023-02-10 NOTE — TELEPHONE ENCOUNTER
If she is having current symptoms she should go back to the ER.    Okay to use a 12 or 1230 virtual spot 2/17.   increased edema x couple of months and sob with exertion hx of chf

## 2023-02-10 NOTE — TELEPHONE ENCOUNTER
Tried calling but did not reach her.  Reviewed ED notes and stress echo.  Stress echo was normal.  It does look like her blood pressure runs on the low side.  I would start with increasing her salt intake, fluids and wearing knee-high compression stockings.  Could also consider medications to boost her blood pressure if needed.  Can you arrange follow-up with me in clinic?  Thank you let me know if she has any questions and I can call her back.

## 2023-02-10 NOTE — TELEPHONE ENCOUNTER
"Dr. Allen,  Please review ED visit for concussion and stress echo results.  Patient wonders if she may have had low blood pressure at the time, causing her to fall, just prior to her concussion.  Any new orders or recommendations?  Thank you,  Dasha    Reached out to patient to discuss her concussion.  She had nephrology consult 2/7/2023.  Patient was told by her volleyball co-player her \"dive for the ball\" did not look like a typical dive for her.  Patient wonders if she had low blood pressure leading to a fall.  Assured patient an update will be forwarded to Dr. Allen to review her normal stress echo and ED visit.  Patient verbalized understanding and is thankful for the call.  "

## 2023-02-11 ENCOUNTER — HEALTH MAINTENANCE LETTER (OUTPATIENT)
Age: 38
End: 2023-02-11

## 2023-02-23 RX ORDER — FLUDROCORTISONE ACETATE 0.1 MG/1
0.1 TABLET ORAL DAILY
Qty: 30 TABLET | Refills: 11 | Status: SHIPPED | OUTPATIENT
Start: 2023-02-23

## 2023-02-23 NOTE — TELEPHONE ENCOUNTER
Called and spoke with her.  Recommend florinef 0.1 mg daily and then schedule follow up with me.  I sent in script .

## 2023-03-03 ENCOUNTER — OFFICE VISIT (OUTPATIENT)
Dept: CARDIOLOGY | Facility: CLINIC | Age: 38
End: 2023-03-03
Payer: COMMERCIAL

## 2023-03-03 VITALS
HEART RATE: 84 BPM | BODY MASS INDEX: 24.16 KG/M2 | SYSTOLIC BLOOD PRESSURE: 101 MMHG | OXYGEN SATURATION: 97 % | WEIGHT: 145 LBS | HEIGHT: 65 IN | DIASTOLIC BLOOD PRESSURE: 60 MMHG | RESPIRATION RATE: 12 BRPM

## 2023-03-03 DIAGNOSIS — R55 VASOVAGAL SYNCOPE: Primary | ICD-10-CM

## 2023-03-03 PROCEDURE — 99214 OFFICE O/P EST MOD 30 MIN: CPT | Performed by: INTERNAL MEDICINE

## 2023-03-03 NOTE — LETTER
3/3/2023    Melany Pereyra, CNP  [address] [place] [address]    RE: Dasha Leonard       Dear Colleague,     I had the pleasure of seeing Dasha Leonard in the [facility].    HEART CARE ENCOUNTER CONSULTATON NOTE      JANETH [facility]  [phone]      Assessment/Recommendations   Assessment:  1.  Dyslipidemia: At this point she is still low risk and would not recommend statin therapy at this time.  Could consider CT calcium scanning for further risk assessment.  She would like to hold off at this time.  2.  Vasovagal syncope: Runs very low blood pressure and seems to be improving on a low-dose of Florinef.  3.  Chronic kidney disease: Recommend seeing nephrology for further evaluation    Plan:  1.    Continue Florinef  2.    Increase salt intake, fluids, electrolytes, compression stocking  Follow-up in a year     History of Present Illness/Subjective    HPI: Dasha Leonard is a 38 year old female with family history of heart disease and hyperlipidemia who I am seeing today in follow-up due to an episode of potential syncope.  She reports that she has had years of episodes of presyncope where her vision will become black and she feels like she is going to pass out.  Usually it passes if she sits or lies down.  It occurs maybe a few times a month.  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  She plays volleyball and recently when she was playing volleyball she went to get a ball and when she dove for it she fell and hit her head.  She actually blacked out.  She was told that she felt funny and she does not recall any of the events prior to the fall and felt that maybe she passed out prior to hitting her head.  She was started on Florinef 0.1 mg daily.  She does notice some improvement in symptoms since starting the medication.       Physical Examination  Review of Systems   Vitals: /60 (BP Location: Left arm, Patient Position: Sitting, Cuff Size: Adult  "Regular)   Pulse 84   Resp 12   Ht 1.651 m (5' 5\")   Wt 65.8 kg (145 lb)   SpO2 97%   BMI 24.13 kg/m    BMI= Body mass index is 24.13 kg/m .  Wt Readings from Last 3 Encounters:   03/03/23 65.8 kg (145 lb)   02/08/23 64 kg (141 lb)   02/01/23 63.9 kg (140 lb 14.4 oz)       General Appearance:   no distress, normal body habitus   ENT/Mouth: membranes moist, no oral lesions or bleeding gums.      EYES:  no scleral icterus, normal conjunctivae   Neck: no carotid bruits or thyromegaly   Chest/Lungs:   lungs are clear to auscultation   Cardiovascular:   Regular. Normal first and second heart sounds with no murmurs  no edema bilaterally normal radial pulses       Extremities: no cyanosis or clubbing   Skin: no xanthelasma, warm.    Neurologic:  no tremors     Psychiatric: alert and oriented x3, calm        Please refer above for cardiac ROS details.        Medical History  Surgical History Family History Social History   Hyperlipidemia  Vasovagal syncope No past surgical history on file.    Family history of premature coronary disease   Social History     Socioeconomic History     Marital status: Single     Spouse name: Not on file     Number of children: Not on file     Years of education: Not on file     Highest education level: Not on file   Occupational History     Not on file   Tobacco Use     Smoking status: Never     Smokeless tobacco: Never   Substance and Sexual Activity     Alcohol use: No     Drug use: No     Sexual activity: Not on file   Other Topics Concern     Not on file   Social History Narrative     Not on file     Social Determinants of Health     Financial Resource Strain: Not on file   Food Insecurity: Not on file   Transportation Needs: Not on file   Physical Activity: Not on file   Stress: Not on file   Social Connections: Not on file   Intimate Partner Violence: Not on file   Housing Stability: Not on file           Medications  Allergies   Current Outpatient Medications   Medication Sig " Dispense Refill     fludrocortisone (FLORINEF) 0.1 MG tablet Take 1 tablet (0.1 mg) by mouth daily 30 tablet 11     KURVELO 0.15-30 MG-MCG tablet Take 1 tablet by mouth daily       chlorhexidine (PERIDEX) 0.12 % solution Swish and spit 15 mLs in mouth 2 times daily (Patient not taking: Reported on 2/1/2023) 473 mL 1       Allergies   Allergen Reactions     Sulfa (Sulfonamide Antibiotics) [Sulfa Drugs] Rash          Lab Results    Chemistry/lipid CBC Cardiac Enzymes/BNP/TSH/INR   Recent Labs   Lab Test 01/16/23  1505   CHOL 235*   HDL 62   *   TRIG 84     Recent Labs   Lab Test 01/16/23  1505 10/05/22  0732   * 169*     Recent Labs   Lab Test 01/16/23  1505 11/14/22  0703   NA  --  136   POTASSIUM  --  4.1   CHLORIDE  --  102   CO2  --  24   GLC  --  102*   BUN  --  12.4   CR 1.22* 1.08*   GFRESTIMATED 59* 68   ALLAN  --  9.1     Recent Labs   Lab Test 01/16/23  1505 11/14/22  0703 10/05/22  0732   CR 1.22* 1.08* 1.10*     No results for input(s): A1C in the last 07966 hours.       Recent Labs   Lab Test 10/05/22  0732   WBC 4.3   HGB 15.1   HCT 46.2   MCV 91        Recent Labs   Lab Test 10/05/22  0732 03/31/22  1705   HGB 15.1 13.2    No results for input(s): TROPONINI in the last 60885 hours.  No results for input(s): BNP, NTBNPI, NTBNP in the last 60657 hours.  No results for input(s): TSH in the last 54894 hours.  No results for input(s): INR in the last 23146 hours.     Rhianna Allen MD    Thank you for allowing me to participate in the care of your patient.      Sincerely,     Rhianna Allen MD   [facility]  cc: No referring provider defined for this encounter.

## 2023-03-03 NOTE — PROGRESS NOTES
"  HEART CARE ENCOUNTER CONSULTATON NOTE      [facility]  [phone]      Assessment/Recommendations   Assessment:  1.  Dyslipidemia: At this point she is still low risk and would not recommend statin therapy at this time.  Could consider CT calcium scanning for further risk assessment.  She would like to hold off at this time.  2.  Vasovagal syncope: Runs very low blood pressure and seems to be improving on a low-dose of Florinef.  3.  Chronic kidney disease: Recommend seeing nephrology for further evaluation    Plan:  1.    Continue Florinef  2.    Increase salt intake, fluids, electrolytes, compression stocking  Follow-up in a year     History of Present Illness/Subjective    HPI: Dasha Leonard is a 38 year old female with family history of heart disease and hyperlipidemia who I am seeing today in follow-up due to an episode of potential syncope.  She reports that she has had years of episodes of presyncope where her vision will become black and she feels like she is going to pass out.  Usually it passes if she sits or lies down.  It occurs maybe a few times a month.  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  She plays volleyball and recently when she was playing volleyball she went to get a ball and when she dove for it she fell and hit her head.  She actually blacked out.  She was told that she felt funny and she does not recall any of the events prior to the fall and felt that maybe she passed out prior to hitting her head.  She was started on Florinef 0.1 mg daily.  She does notice some improvement in symptoms since starting the medication.       Physical Examination  Review of Systems   Vitals: /60 (BP Location: Left arm, Patient Position: Sitting, Cuff Size: Adult Regular)   Pulse 84   Resp 12   Ht 1.651 m (5' 5\")   Wt 65.8 kg (145 lb)   SpO2 97%   BMI 24.13 kg/m    BMI= Body mass index is 24.13 kg/m .  Wt Readings from Last 3 Encounters:   03/03/23 65.8 kg (145 " lb)   02/08/23 64 kg (141 lb)   02/01/23 63.9 kg (140 lb 14.4 oz)       General Appearance:   no distress, normal body habitus   ENT/Mouth: membranes moist, no oral lesions or bleeding gums.      EYES:  no scleral icterus, normal conjunctivae   Neck: no carotid bruits or thyromegaly   Chest/Lungs:   lungs are clear to auscultation   Cardiovascular:   Regular. Normal first and second heart sounds with no murmurs  no edema bilaterally normal radial pulses       Extremities: no cyanosis or clubbing   Skin: no xanthelasma, warm.    Neurologic:  no tremors     Psychiatric: alert and oriented x3, calm        Please refer above for cardiac ROS details.        Medical History  Surgical History Family History Social History   Hyperlipidemia  Vasovagal syncope No past surgical history on file.    Family history of premature coronary disease   Social History     Socioeconomic History     Marital status: Single     Spouse name: Not on file     Number of children: Not on file     Years of education: Not on file     Highest education level: Not on file   Occupational History     Not on file   Tobacco Use     Smoking status: Never     Smokeless tobacco: Never   Substance and Sexual Activity     Alcohol use: No     Drug use: No     Sexual activity: Not on file   Other Topics Concern     Not on file   Social History Narrative     Not on file     Social Determinants of Health     Financial Resource Strain: Not on file   Food Insecurity: Not on file   Transportation Needs: Not on file   Physical Activity: Not on file   Stress: Not on file   Social Connections: Not on file   Intimate Partner Violence: Not on file   Housing Stability: Not on file           Medications  Allergies   Current Outpatient Medications   Medication Sig Dispense Refill     fludrocortisone (FLORINEF) 0.1 MG tablet Take 1 tablet (0.1 mg) by mouth daily 30 tablet 11     KURVELO 0.15-30 MG-MCG tablet Take 1 tablet by mouth daily       chlorhexidine (PERIDEX) 0.12 %  solution Swish and spit 15 mLs in mouth 2 times daily (Patient not taking: Reported on 2/1/2023) 473 mL 1       Allergies   Allergen Reactions     Sulfa (Sulfonamide Antibiotics) [Sulfa Drugs] Rash          Lab Results    Chemistry/lipid CBC Cardiac Enzymes/BNP/TSH/INR   Recent Labs   Lab Test 01/16/23  1505   CHOL 235*   HDL 62   *   TRIG 84     Recent Labs   Lab Test 01/16/23  1505 10/05/22  0732   * 169*     Recent Labs   Lab Test 01/16/23  1505 11/14/22  0703   NA  --  136   POTASSIUM  --  4.1   CHLORIDE  --  102   CO2  --  24   GLC  --  102*   BUN  --  12.4   CR 1.22* 1.08*   GFRESTIMATED 59* 68   ALLAN  --  9.1     Recent Labs   Lab Test 01/16/23  1505 11/14/22  0703 10/05/22  0732   CR 1.22* 1.08* 1.10*     No results for input(s): A1C in the last 53941 hours.       Recent Labs   Lab Test 10/05/22  0732   WBC 4.3   HGB 15.1   HCT 46.2   MCV 91        Recent Labs   Lab Test 10/05/22  0732 03/31/22  1705   HGB 15.1 13.2    No results for input(s): TROPONINI in the last 96486 hours.  No results for input(s): BNP, NTBNPI, NTBNP in the last 30080 hours.  No results for input(s): TSH in the last 60308 hours.  No results for input(s): INR in the last 98670 hours.     Rhianna Allen MD

## 2024-03-09 ENCOUNTER — HEALTH MAINTENANCE LETTER (OUTPATIENT)
Age: 39
End: 2024-03-09

## 2024-05-13 ENCOUNTER — LAB REQUISITION (OUTPATIENT)
Dept: LAB | Facility: CLINIC | Age: 39
End: 2024-05-13

## 2024-05-13 DIAGNOSIS — R79.89 OTHER SPECIFIED ABNORMAL FINDINGS OF BLOOD CHEMISTRY: ICD-10-CM

## 2024-05-13 DIAGNOSIS — Z11.3 ENCOUNTER FOR SCREENING FOR INFECTIONS WITH A PREDOMINANTLY SEXUAL MODE OF TRANSMISSION: ICD-10-CM

## 2024-05-13 DIAGNOSIS — Z12.4 ENCOUNTER FOR SCREENING FOR MALIGNANT NEOPLASM OF CERVIX: ICD-10-CM

## 2024-05-13 PROCEDURE — 87491 CHLMYD TRACH DNA AMP PROBE: CPT | Performed by: OBSTETRICS & GYNECOLOGY

## 2024-05-13 PROCEDURE — 82565 ASSAY OF CREATININE: CPT | Performed by: OBSTETRICS & GYNECOLOGY

## 2024-05-13 PROCEDURE — 87624 HPV HI-RISK TYP POOLED RSLT: CPT | Performed by: OBSTETRICS & GYNECOLOGY

## 2024-05-13 PROCEDURE — G0145 SCR C/V CYTO,THINLAYER,RESCR: HCPCS | Performed by: OBSTETRICS & GYNECOLOGY

## 2024-05-14 LAB
C TRACH DNA SPEC QL PROBE+SIG AMP: NEGATIVE
CREAT SERPL-MCNC: 0.99 MG/DL (ref 0.51–0.95)
EGFRCR SERPLBLD CKD-EPI 2021: 74 ML/MIN/1.73M2
N GONORRHOEA DNA SPEC QL NAA+PROBE: NEGATIVE

## 2024-05-17 LAB
BKR LAB AP GYN ADEQUACY: NORMAL
BKR LAB AP GYN INTERPRETATION: NORMAL
BKR LAB AP HPV REFLEX: NORMAL
BKR LAB AP LMP: NORMAL
BKR LAB AP PREVIOUS ABNL DX: NORMAL
BKR LAB AP PREVIOUS ABNORMAL: NORMAL
PATH REPORT.COMMENTS IMP SPEC: NORMAL
PATH REPORT.COMMENTS IMP SPEC: NORMAL
PATH REPORT.RELEVANT HX SPEC: NORMAL

## 2024-05-24 LAB
HPV HR 12 DNA CVX QL NAA+PROBE: NEGATIVE
HPV16 DNA CVX QL NAA+PROBE: NEGATIVE
HPV18 DNA CVX QL NAA+PROBE: NEGATIVE
HUMAN PAPILLOMA VIRUS FINAL DIAGNOSIS: NORMAL

## 2024-07-14 ENCOUNTER — PATIENT OUTREACH (OUTPATIENT)
Dept: CARE COORDINATION | Facility: CLINIC | Age: 39
End: 2024-07-14
Payer: COMMERCIAL

## 2025-01-30 ENCOUNTER — NURSE TRIAGE (OUTPATIENT)
Dept: INTERNAL MEDICINE | Facility: CLINIC | Age: 40
End: 2025-01-30
Payer: COMMERCIAL

## 2025-01-30 PROBLEM — G54.0 THORACIC OUTLET SYNDROME: Status: ACTIVE | Noted: 2023-01-15

## 2025-01-30 NOTE — TELEPHONE ENCOUNTER
S-(situation): New onset dizziness    B-(background): Symptom onset Sunday 1/26. Patient stated this is when symptoms were the worst. Symptoms have been episodic since then but not as severe.     A-(assessment): Several mild episodes per day of dizziness. Will resolve on own and last briefly. Described spinning/tilting sensation. Will become nauseous during this time. Denied vomiting. Has had a mild headache that comes and goes but described that is feels like possible eye strain. Denied blurry or double vision. Denied stiff neck. Denied fever. Of note she said she had has new onset left foot tingling that comes and goes that started 1 week before dizziness. She denied symptoms of dehydration. Denied weakness, numbness, disorientation palpitations, racing heart rate.     R-(recommendations): Disposition to be seen in office today. Patient does not currently have a pcp. I did discuss being seen in ED related to tingling in foot as a neurological symptom even though this had started a week before dizziness but patient expressed she does not think that would be necessary. Verbalized understanding the risks and making a decision not to be seen emergently. She is agreeable to OV. She will plan on UC today if no OV available. Understanding red flag symptoms. She had no further questions.       Reason for Disposition   MODERATE dizziness (e.g., interferes with normal activities)  (Exception: Dizziness caused by heat exposure, sudden standing, or poor fluid intake.)    Additional Information   Negative: SEVERE difficulty breathing (e.g., struggling for each breath, speaks in single words)   Negative: Shock suspected (e.g., cold/pale/clammy skin, too weak to stand, low BP, rapid pulse)   Negative: Difficult to awaken or acting confused (e.g., disoriented, slurred speech)   Negative: Fainted, and still feels dizzy afterwards   Negative: Overdose (accidental or intentional) of medications   Negative: New neurologic deficit  that is present now: * Weakness of the face, arm, or leg on one side of the body * Numbness of the face, arm, or leg on one side of the body * Loss of speech or garbled speech   Negative: Heart beating < 50 beats per minute OR > 140 beats per minute   Negative: Sounds like a life-threatening emergency to the triager   Negative: Chest pain   Negative: Rectal bleeding, bloody stool, or tarry-black stool   Negative: Vomiting is main symptom   Negative: Diarrhea is main symptom   Negative: Headache is main symptom   Negative: Heat exhaustion suspected (i.e., dehydration from heat exposure)   Negative: Patient states that they are having an anxiety or panic attack   Negative: Dizziness from low blood sugar (i.e., < 60 mg/dl or 3.5 mmol/l)   Negative: SEVERE dizziness (e.g., unable to stand, requires support to walk, feels like passing out now)   Negative: SEVERE headache or neck pain   Negative: Spinning or tilting sensation (vertigo) present now and one or more stroke risk factors (i.e., hypertension, diabetes mellitus, prior stroke/TIA, heart attack, age over 60) (Exception: Prior physician evaluation for this AND no different/worse than usual.)   Negative: Neurologic deficit that was brief (now gone), ANY of the following:* Weakness of the face, arm, or leg on one side of the body* Numbness of the face, arm, or leg on one side of the body* Loss of speech or garbled speech   Negative: Loss of vision or double vision  (Exception: Similar to previous migraines.)   Negative: Extra heartbeats, irregular heart beating, or heart is beating very fast (i.e., 'palpitations')   Negative: Difficulty breathing   Negative: Drinking very little and dehydration suspected (e.g., no urine > 12 hours, very dry mouth, very lightheaded)   Negative: Follows bleeding (e.g., stomach, rectum, vagina)  (Exception: Became dizzy from sight of small amount blood.)   Negative: Patient sounds very sick or weak to the triager   Negative:  Lightheadedness (dizziness) present now, after 2 hours of rest and fluids   Negative: Spinning or tilting sensation (vertigo) present now   Negative: Fever > 103 F (39.4 C)   Negative: Fever > 100.0 F (37.8 C) and has diabetes mellitus or a weak immune system (e.g., HIV positive, cancer chemotherapy, organ transplant, splenectomy, chronic steroids)    Protocols used: Dizziness-A-OH

## 2025-03-16 ENCOUNTER — HEALTH MAINTENANCE LETTER (OUTPATIENT)
Age: 40
End: 2025-03-16

## 2025-05-29 ENCOUNTER — NURSE TRIAGE (OUTPATIENT)
Dept: NURSING | Facility: CLINIC | Age: 40
End: 2025-05-29
Payer: COMMERCIAL

## 2025-05-30 NOTE — TELEPHONE ENCOUNTER
Nurse Triage SBAR    Is this a 2nd Level Triage? NO    Situation:   Widespread rash  Ankle and wrist pain  Nausea  Mild headache    Background:   She was at a park on Tuesday.  Doesn't think she got any bug bites.  Has a mild sunburn.    Assessment:   Red rashes widespread in arms, legs, and torso that started last night with chills.  They vary in size and shape.  Pain in her wrist started today that worsened throughout the day and in her ankles.  Mild headache - ibuprofen helped.  No fevers.  No vomiting.      Protocol Recommended Disposition:   Go to ED Now (Or PCP Triage)    Recommendation:   Advised pt to be seen in the ED.  Care advice given.  Pt verbalized understanding.    Bethany Cool, RN, BSN Nurse Triage Advisor 5/29/2025 10:28 PM      Reason for Disposition   Joint pain or swelling    Additional Information   Negative: [1] Life-threatening reaction (anaphylaxis) in the past to similar substance (e.g., food, insect bite/sting, chemical, etc.) AND [2] < 2 hours since exposure   Negative: [1] Sudden onset of rash (within last 2 hours) AND [2] difficulty breathing or swallowing   Negative: Shock suspected (e.g., cold/pale/clammy skin, too weak to stand, low BP, rapid pulse)   Negative: Difficult to awaken or acting confused (e.g., disoriented, slurred speech)   Negative: [1] Purple or blood-colored spots or dots AND [2] fever   Negative: Sounds like a life-threatening emergency to the triager   Negative: [1] Drug rash suspected AND [2] started taking new medicine within last 2 weeks  (Exception: Antihistamine, eye drops, ear drops, decongestant or other OTC cough/cold medicines.)   Negative: [1] Chickenpox suspected AND [2] known exposure to chickenpox in past 3 weeks   Negative: Hives suspected   Negative: Insect bites suspected   Negative: [1] Measles suspected AND [2] known exposure to measles in past 3 weeks   Negative: [1] Mpox suspected (e.g., direct skin contact such as sex, recent travel to [place] or  [place]) AND [2] symptoms of Mpox (e.g., rash, fever, muscle aches, or swollen lymph nodes)   Negative: [1] At risk for Mpox (men-who-have-sex-with-men) AND [2] possible exposure (e.g., multiple sex partners in past 21 days) AND [3] symptoms of Mpox (e.g., rash, fever, muscle aches, or swollen lymph nodes)   Negative: Swimmer's Itch suspected   Negative: Sunburn suspected   Negative: [1] Bright red, sunburn-like rash AND [2] current tampon use or nasal packing   Negative: [1] Bright red, sunburn-like rash AND [3] wound infection or recent surgery   Negative: [1] Bright red skin AND [2] peels off in sheets   Negative: Stiff neck (can't touch chin to chest)   Negative: Fever    Protocols used: Rash or Redness - Widespread-A-AH